# Patient Record
Sex: FEMALE | Race: WHITE | Employment: OTHER | URBAN - METROPOLITAN AREA
[De-identification: names, ages, dates, MRNs, and addresses within clinical notes are randomized per-mention and may not be internally consistent; named-entity substitution may affect disease eponyms.]

---

## 2018-01-03 ENCOUNTER — OP HISTORICAL/CONVERTED ENCOUNTER (OUTPATIENT)
Dept: OTHER | Age: 68
End: 2018-01-03

## 2018-09-10 ENCOUNTER — OP HISTORICAL/CONVERTED ENCOUNTER (OUTPATIENT)
Dept: OTHER | Age: 68
End: 2018-09-10

## 2018-09-12 ENCOUNTER — IP HISTORICAL/CONVERTED ENCOUNTER (OUTPATIENT)
Dept: OTHER | Age: 68
End: 2018-09-12

## 2021-04-07 DIAGNOSIS — Z12.31 SCREENING MAMMOGRAM, ENCOUNTER FOR: Primary | ICD-10-CM

## 2021-06-30 ENCOUNTER — HOSPITAL ENCOUNTER (OUTPATIENT)
Dept: PHYSICAL THERAPY | Age: 71
Discharge: HOME | End: 2021-06-30
Payer: MEDICARE

## 2021-06-30 PROCEDURE — 97140 MANUAL THERAPY 1/> REGIONS: CPT

## 2021-06-30 PROCEDURE — 97162 PT EVAL MOD COMPLEX 30 MIN: CPT

## 2021-06-30 NOTE — PROGRESS NOTES
[address]  Ph: [phone]    Fax: [phone]    Initial Evaluation/Plan of Care/Statement of Necessity for Physical Therapy Services     Patient name: Jovan Lara    Date/Start of Care:2021  : 1950  [x]  Patient  Verified Provider#: [id number]          Referral source: Bruce Childers MD Return visit to MD: Aug 1st      Medical/Treatment Diagnosis: Pain in right knee [M25.561]  Right hip pain [M25.551]  Arthrodesis status [Z98.1]  Iliotibial band syndrome, unspecified leg [M76.30]    Payor: [place] MEDICARE / Plan: [place] MEDICARE PART A & B / Product Type: Medicare /       Prior Hospitalization: see medical history     Comorbidities: see chart   Prior Level of Function: Independent no device. Medications: Verified on Patient Summary List          Patient / Family readiness to learn indicated by: asking questions, trying to perform skills and interest  Persons(s) to be included in education: patient (P)  Barriers to Learning/Limitations: None  Patient Self Reported Health Status:fair   Rehabilitation Potential: good  Previous Treatment/Compliance: none. PMHx/Surgical Hx: Patient has back surgery and achiles tendeon repair on R leg, DM, OA, high BP  Work Hx: retired   Living Situation: Patient lives with family, in a house, with steps to go in/out. Barriers to progress: chronicity of condition   Motivation: Good  Substance use: N/A  Cognition: A & O x 4  Onset Date: about 1 year 2020   In time:11:20   Out time:12:25 Total Treatment Time (min): 65   Total Timed Codes (min): 10  1:1 Treatment Time ( only): 10   Visit #:  1 Visit count could not be calculated. Make sure you are using a visit which is associated with an episode. SUBJECTIVE  Patient was referred to PT due to R hip/knee pain that started over a year ago on the lateral side of her thigh. She reports no specific injuries to the area, she did received a hip injection since MD throught it was bursitis but it did not helped. . She c/o constant sharp pain, her walking is limited and she has pain as soon as she stands up and as she continues to walk pain gets worse. No reports of numbness or tingling and stated her knee ccational maddy. MD also thinks it might be coming from the back and she is following up her back surgeon. PCP did a x-ray, and dx her with arthodossis and ITB syndrome. Patient wishes  she would be more active. She reports pain with standing, walking, laying on the R side at night and as a result difficulty sleeping at night. She stated sitting gets some relief or ice/heat but its short lived. She c/o 6/10 pain at rest and 9-10/10 with activity. She reports functional limitations with: sleeping at night, WB, stairs and ambulation. Mechanism of Injury: Unknown      PAIN:  Area of pain: R hip lateral joint line. Pain Level (0-10 scale)  At rest: 6/10 With activity: 9/10    Worst: 10/10 Least: 4/10   Pain Level (0-10 scale) pre treatment: 6/10  Pain Level (0-10 scale) post treatment: 7/10 post ice and STM tender to area  Goal: \" Relief some of the pain\"   OBJECTIVE  Physical Findings   Ortho:   Posture:  Decreased lordosis, R foot ER, leans towards L side with WB. Gross findings:  Antalgic gait does not wear orthotics but has them  Gait and Functional Mobility:  Antalgic /slow gait pattern  Palpation: TTP on R glut, ITB from hip to knee, TTP GT   Mild swlling at GT   Spine: *normal values in ()  Specific joints:   Hip      AROM       PROM             MMT   R L R L R L   Flexion (120) 92  110   4-p! 4-   Extension (15)     4- 3+   Abduction (40)     3-p!      Adduction (30)         IR (40)         ER (40)         Additional comments:Tightness noted with hip ER on L LE /Sharp pain reported on on R hip with PROM in all directions   SLR lag on R LE due pain   Difficulty keeping knee in extension with hip abd   Unable to lay on R side Knee          AROM          PROM                       MMT   R L R L R L   Extension (0)  0 0   4p! 4   Flexion (145) 130 132   4-p! 4   Able to lay prone     L quad flexibility 85deg  R quad 70 p! Lateral hip    ANKLE                               AROM                     PROM                     MMT:   R L R L R L   Dorsiflexion (15)      3+p! At hip  4   Plantarflexion (50)     4 4   Inversion (35)          Eversion (25)         Additional comments:       Trunk ROM:   Flexion:                         [] N/A  []WNL  []Minimal  [x]Moderate  [] Major 39cm from 3rd finger to floor repots hip/knee  p! Extension:                     [] N/A  []WNL  []Minimal  [x]Moderate  [] Major p! At hip    Gait and Functional Mobility:  Transfers:   Bed mobility: independent unable to lay on R side lying  Sit to/from Supine: independent  Sit to/from stands: independent   Gait: antalgic gait   Assistive device: none but recommended a  Cane- to ease pain  Gait speed: NT  TUG test: 14 sec without device  Stairs: negotiate one step at a time. SLS  R 2-3sec  L 3 sec     Lumbar  Special Tests:    Trendelenberg: (+) R    FABERS: (+) on R>L R leg had less ROM   Knee  Special Tests:  Knee Varus/Valgus Stress: (-)   Hip  Special Tests:          Oneda [place]: (-)    Александр: (+) R LE                  H.S. SLR: L HS 60 deg / R LE 60deg Piriformis Ext: (-)          Hip Scour: Limited range noted       Modality rationale: decrease edema, decrease inflammation, decrease pain, increase tissue extensibility and increase muscle contraction/control to improve the patients ability to ambulate with less pain.     Min Type Additional Details    [] Estim: []UnAtt   []Att       []TENS instruct                  []IFC  []Premod   []NMESS []w/US   []w/ice   []w/heat  Position:                                     Location:   10 [x]  Ice     []  Heat  []  Ice massage Position:L sidel lying Location:    []  Traction: [] Cervical       []Lumbar []Intermittent   []Continuous                     Lbs:  []W/heat               []W/heat and Estim    []  Ultrasound: []Continuous   [] Pulsed at:                           []1MHz   []3MHz Location:  W/cm2:    [] Skin assessment post-treatment:   []intact []redness- no adverse reaction   []redness - adverse reaction:   10 min Manual Therapy: R GT and ITB area from hip to knee   Rationale: decrease pain, increase ROM, increase tissue extensibility, decrease edema , correct positional vertigo, decrease trigger points and increase postural awareness to improve the patients ability to ambulate with less pain. With   [] TE   [] TA   [] Neuro   [] SC   [x] other: Patient Education: [] Review HEP    [] Progressed/Changed HEP based on:   [] positioning   [] body mechanics   [] transfers   [] heat/ice application    [] other: educated about using ice, sleeping with pillow between legs, using a cane and massaging area. ASSESSMENT/Changes in Function:   Patient is 68 y/o female was referred to skilled PT services with c/o chronic R hip/knee pain that started over a year ago. Patient presents with decreased R hip AROM and pain with each hip movement, decreased BLE hip/knee strength, decreased balance, decreased hamstring, ITB and quad flexibility and gait impairments. Imaging showed Patient will benefit from skilled PT services to address above.    Problem List/Impairments: pain affecting function, decrease ROM, decrease strength, edema affecting function, impaired gait/ balance, decrease ADL/ functional abilitiies, decrease activity tolerance, decrease flexibility/ joint mobility and decrease transfer abilities  Treatment Plan may include any combination of the following: Therapeutic exercise, Neuromuscular re-education, Physical agent/modality, Gait/balance training, Manual therapy, Patient education, Home safety training and Stair training  Patient/ Caregiver education and instruction: exercises  Frequency / Duration: Patient to be seen 2 times per week for 16  treatments. Certification Period: 6/30/21-9/30/21  Patient Goal (s):  Relief some of the pain        Short Term Goals: To be accomplished in 3-4 treatments. 1. Patient will be independent with her HEP to progress with POC. 2. Patient will increased strength in LEs by 1/2 to 1 grade to improve hip stability. 3. Patient TUG score will improve by 2-3 points to reduce fall risk. 4. Patient will be able to perform a SLS on B LEs > 5-10 sec     Long Term Goals: To be accomplished in 16 treatments. 1. Patient will be able to lay on the R side and sleep more comfortable at night to improve her sleeping quality. 2. Patient will be able to stand >15 min with less R hip discomfort and pain. 3. Patient will be able to do a sit<->stand transfers with less R hip pain and discomfort. 4. Patient will be able to ambulate >15 min with less R hip pain and discomfort. [x]  Plan of care has been reviewed with PTA. The Plan of Care is based on information from the initial evaluation. Michelle Llanos, PT, DPT 6/30/2021   ________________________________________________________________________    I certify that the above Therapy Services are being furnished while the patient is under my care. I agree with the treatment plan and certify that this therapy is necessary.     Physician's Signature:_________________________________________________  Date:____________Time: ____________     Kisha Zavala MD  `

## 2021-07-07 ENCOUNTER — HOSPITAL ENCOUNTER (OUTPATIENT)
Dept: PHYSICAL THERAPY | Age: 71
Discharge: HOME | End: 2021-07-07
Payer: MEDICARE

## 2021-07-07 PROCEDURE — 97140 MANUAL THERAPY 1/> REGIONS: CPT

## 2021-07-07 PROCEDURE — 97110 THERAPEUTIC EXERCISES: CPT

## 2021-07-07 NOTE — PROGRESS NOTES
PT DAILY TREATMENT NOTE - MCR     Patient Name: Talya Alexandre  Date:2021  : 1950  [x]  Patient  Verified  Payor: Jorge Ours / Plan: [place] MEDICARE PART A & B / Product Type: Medicare /    Treatment Area: Pain in right knee [M25.561]  Right hip pain [M25.551]  Arthrodesis status [Z98.1]  Iliotibial band syndrome, unspecified leg [M76.30]   Next MD APPT:   In time:11:32 Out time:12:32   Total Treatment Time (min): 60   Total Timed Codes (min):  50  1:1 Treatment Time ( [address] only): 50   Visit #:   Visit count could not be calculated. Make sure you are using a visit which is associated with an episode. SUBJECTIVE  Pain Level (0-10 scale) pre treatment: 6/10 B hips Pain Level (0-10 scale) post treatment:   Any medication changes, allergies to medications, adverse drug reactions, diagnosis change, or new procedure performed?:   [] No    [] Yes (see summary sheet for update)  Subjective functional status/changes:   [] No changes reported  Reports she hurt her L hip not sure how, went to [facility] urgent care and they gave her lidicaine patch and muscle relaxer they though it was muscular since pain is with movement note at rest. She arrived with a mild  antalgic gait pattern and stated she will try to do her exercises. OBJECTIVE    Modality rationale: decrease edema, decrease inflammation, decrease pain, increase tissue extensibility and increase muscle contraction/control to improve the patients ability to reduce pain with mobility.     Min Type Additional Details    [] Estim: []UnAtt   []Att       []TENS instruct                  []IFC  []Premod   []NMES                     []Other:  []w/US   []w/ice   []w/heat  Position:  Location:   10 min x 2   [x]  Ice     [x]  Heat  []  Ice massage Position:heat ( supine with wedge B hips)  Ice on R hip in side lying       []  Traction: [] Cervical       []Lumbar                       [] Prone          []Supine                       []Intermittent   []Continuous Lbs:  []w/heat  []W/heat and Estim    []  Ultrasound: []Continuous   [] Pulsed at:                           []1MHz   []3MHz Location:  W/cm2:      [x] Skin assessment post-treatment:   [x]intact  []redness- no adverse reaction   []redness - adverse reaction:   30 min Therapeutic Exercise:  [x] See flow sheet :   Rationale: increase ROM, increase strength, improve coordination, improve balance and increase proprioception to improve the patients ability to reduce pain with ambulation    10 min Manual Therapy: L hip   Rationale: decrease pain, increase ROM, increase tissue extensibility, decrease edema , decrease trigger points and increase postural awareness to improve the patients ability to ambulate with less antalgic gait pattern. With   [x] TE   [] TA   [] Neuro   [] SC   [] other: Patient Education: [x] Review HEP    [x] Progressed/Changed HEP based on:   [] positioning   [] body mechanics   [] transfers   [] Use of heat/ice    [] other:          Other Objective/Functional Measures:   POC was initiated     ASSESSMENT/Changes in Function:   Patient tolerated session well, was able to do all exercises and stretches, has some discomfort with B hip ER stretch and required assist with standing hip ITB stretch. Patient presents with tightness ITB in side lying and TTP at grater trochanter down towards R knee but not as tender as on evaluation. Patient was encouraged to continue to ice R hip and alternate heat/ice on L hip. Patient reports slight relief post session. Patient will continue to benefit from skilled PT services to modify and progress therapeutic interventions, address functional mobility deficits, address ROM deficits, address strength deficits, analyze and address soft tissue restrictions, analyze and cue movement patterns, analyze and modify body mechanics/ergonomics and assess and modify postural abnormalities to attain remaining goals. GOALS/Progress towards goals:  Short Term Goals:  To be accomplished in 3-4 treatments. 1. Patient will be independent with her HEP to progress with POC. [] Met [] Not met [] Partially met     2. Patient will increased strength in LEs by 1/2 to 1 grade to improve hip stability. [] Met [] Not met [] Partially met     3. Patient TUG score will improve by 2-3 points to reduce fall risk. [] Met [] Not met [] Partially met     4. Patient will be able to perform a SLS on B LEs > 5-10 sec [] Met [] Not met [] Partially met        Long Term Goals: To be accomplished in 16 treatments. 1. Patient will be able to lay on the R side and sleep more comfortable at night to improve her sleeping quality. [] Met [] Not met [] Partially met     2. Patient will be able to stand >15 min with less R hip discomfort and pain. [] Met [] Not met [] Partially met     3. Patient will be able to do a sit<->stand transfers with less R hip pain and discomfort. [] Met [] Not met [] Partially met     4. Patient will be able to ambulate >15 min with less R hip pain and discomfort.  [] Met [] Not met [] Partially met        PLAN  [x]  Upgrade activities as tolerated     [x]  Continue plan of care  []  Update interventions per flow sheet       []  Discharge due to:_  []  Other:_      Michelle Llanos, PT, DPT 7/7/2021

## 2021-07-12 ENCOUNTER — HOSPITAL ENCOUNTER (OUTPATIENT)
Dept: PHYSICAL THERAPY | Age: 71
Discharge: HOME | End: 2021-07-12
Payer: MEDICARE

## 2021-07-12 PROCEDURE — 97140 MANUAL THERAPY 1/> REGIONS: CPT

## 2021-07-12 PROCEDURE — 97110 THERAPEUTIC EXERCISES: CPT

## 2021-07-12 NOTE — PROGRESS NOTES
PT DAILY TREATMENT NOTE - MCR     Patient Name: Cheryl Manning  Date:2021  : 1950  [x]  Patient  Verified  Payor: Adis Adkins / Plan: [place] MEDICARE PART A & B / Product Type: Medicare /    Treatment Area: Pain in right knee [M25.561]  Right hip pain [M25.551]  Arthrodesis status [Z98.1]  Iliotibial band syndrome, unspecified leg [M76.30]   Next MD APPT:   In time:11:18 am Out time:12:25  Total Treatment Time (min): 67   Total Timed Codes (min):  57  1:1 Treatment Time ( [address] only): 57  Visit #: 3/16  Visit count could not be calculated. Make sure you are using a visit which is associated with an episode. SUBJECTIVE  Pain Level (0-10 scale) pre treatment: 5/10 B hips Pain Level (0-10 scale) post treatment: /10  Any medication changes, allergies to medications, adverse drug reactions, diagnosis change, or new procedure performed?:   [] No    [] Yes (see summary sheet for update)  Subjective functional status/changes:   [] No changes reported  Stated her right feels better, oc  Reports she hurt her L hip not sure how, went to [facility] urgent care and they gave her lidicaine patch and muscle relaxer they though it was muscular since pain is with movement note at rest. She arrived with a mild  antalgic gait pattern and stated she will try to do her exercises. OBJECTIVE    Modality rationale: decrease edema, decrease inflammation, decrease pain, increase tissue extensibility and increase muscle contraction/control to improve the patients ability to reduce pain with mobility.     Min Type Additional Details    [] Estim: []UnAtt   []Att       []TENS instruct                  []IFC  []Premod   []NMES                     []Other:  []w/US   []w/ice   []w/heat  Position:  Location:   10 min  [x]  Ice     []  Heat  []  Ice massage Position: side lying  Ice on R hip       []  Traction: [] Cervical       []Lumbar                       [] Prone          []Supine                       []Intermittent   []Continuous Lbs:  []w/heat  []W/heat and Estim    []  Ultrasound: []Continuous   [] Pulsed at:                           []1MHz   []3MHz Location:  W/cm2:      [x] Skin assessment post-treatment:   [x]intact  []redness- no adverse reaction   []redness - adverse reaction:   47 min Therapeutic Exercise:  [x] See flow sheet :   Rationale: increase ROM, increase strength, improve coordination, improve balance and increase proprioception to improve the patients ability to reduce pain with ambulation    15 min Manual Therapy: L hip, ITB area   Rationale: decrease pain, increase ROM, increase tissue extensibility, decrease edema , decrease trigger points and increase postural awareness to improve the patients ability to ambulate with less antalgic gait pattern. With   [x] TE   [] TA   [] Neuro   [] SC   [] other: Patient Education: [x] Review HEP    [x] Progressed/Changed HEP based on:   [] positioning   [] body mechanics   [] transfers   [] Use of heat/ice    [] other:          Other Objective/Functional Measures:   POC was continued      ASSESSMENT/Changes in Function:   Patient tolerated session well, we continue to progress with exercises. Modified ITB stretch to supine and added more hip strengthening. Patient is TTP at greater trochanter and long ITB  With STM. She reports pain with hip ITB stretch in side lying and supine but tolerated stretch. . Patient was encouraged to continue to ice R hip and alternate heat/ice on L hip. Patient reports slight relief post session. Patient will continue to benefit from skilled PT services to modify and progress therapeutic interventions, address functional mobility deficits, address ROM deficits, address strength deficits, analyze and address soft tissue restrictions, analyze and cue movement patterns, analyze and modify body mechanics/ergonomics and assess and modify postural abnormalities to attain remaining goals. GOALS/Progress towards goals:  Short Term Goals:  To be accomplished in 3-4 treatments. 1. Patient will be independent with her HEP to progress with POC. [] Met [] Not met [] Partially met     2. Patient will increased strength in LEs by 1/2 to 1 grade to improve hip stability. [] Met [] Not met [] Partially met     3. Patient TUG score will improve by 2-3 points to reduce fall risk. [] Met [] Not met [] Partially met     4. Patient will be able to perform a SLS on B LEs > 5-10 sec [] Met [] Not met [] Partially met        Long Term Goals: To be accomplished in 16 treatments. 1. Patient will be able to lay on the R side and sleep more comfortable at night to improve her sleeping quality. [] Met [] Not met [] Partially met     2. Patient will be able to stand >15 min with less R hip discomfort and pain. [] Met [] Not met [] Partially met     3. Patient will be able to do a sit<->stand transfers with less R hip pain and discomfort. [] Met [] Not met [] Partially met     4. Patient will be able to ambulate >15 min with less R hip pain and discomfort.  [] Met [] Not met [] Partially met        PLAN  [x]  Upgrade activities as tolerated     [x]  Continue plan of care  []  Update interventions per flow sheet       []  Discharge due to:_  []  Other:_      Michelle Llanos, PT, DPT 7/12/2021

## 2021-07-14 ENCOUNTER — HOSPITAL ENCOUNTER (OUTPATIENT)
Dept: PHYSICAL THERAPY | Age: 71
Discharge: HOME | End: 2021-07-14
Payer: MEDICARE

## 2021-07-14 PROCEDURE — 97110 THERAPEUTIC EXERCISES: CPT

## 2021-07-14 PROCEDURE — 97035 APP MDLTY 1+ULTRASOUND EA 15: CPT

## 2021-07-14 PROCEDURE — 97140 MANUAL THERAPY 1/> REGIONS: CPT

## 2021-07-14 NOTE — PROGRESS NOTES
PT DAILY TREATMENT NOTE - [facility]     Patient Name: Rafael Groves  Date:2021  : 1950  [x]  Patient  Verified  Payor: Benedict Locker / Plan: [place] / Product Type: Medicare /    Treatment Area: Pain in right knee [M25.561]  Right hip pain [M25.551]  Arthrodesis status [Z98.1]  Iliotibial band syndrome, unspecified leg [M76.30]   Next MD APPT:   In time:11:23 am Out time:12:32  Total Treatment Time (min): 68  Total Timed Codes (min):  53  1:1 Treatment Time ([facility] only): 53  Visit #:   Visit count could not be calculated. Make sure you are using a visit which is associated with an episode. SUBJECTIVE  Pain Level (0-10 scale) pre treatment: 7/10 B hips Pain Level (0-10 scale) post treatment: 4/10 more loose but has some pain. Any medication changes, allergies to medications, adverse drug reactions, diagnosis change, or new procedure performed?:   [] No    [] Yes (see summary sheet for update)  Subjective functional status/changes:   [] No changes reported  Reports more R ITB pain today that shots down to the knee, she did not do any thing difference but been having pain that comes and goes since this morning. . She arrived with a mild  antalgic gait pattern and reports L hip still hurts and she has a patch on. OBJECTIVE    Modality rationale: decrease edema, decrease inflammation, decrease pain, increase tissue extensibility and increase muscle contraction/control to improve the patients ability to reduce pain with mobility.     Min Type Additional Details    [] Estim: []UnAtt   []Att       []TENS instruct                  []IFC  []Premod   []NMES                     []Other:  []w/US   []w/ice   []w/heat  Position:  Location:   15 min  [x]  Ice     [x]  Heat  []  Ice massage Position: supine    Ice on R hip / heat on R ITB and L hip      []  Traction: [] Cervical       []Lumbar                       [] Prone          []Supine                       []Intermittent   []Continuous Lbs:  []w/heat  []W/heat and Estim   8 [x]  Ultrasound: [x]Continuous   [] Pulsed at:                           []1MHz   [x]3MHz Location:R ITB from GT to knee joint   W/cm2: 1.5      [x] Skin assessment post-treatment:   [x]intact  []redness- no adverse reaction   []redness - adverse reaction:   30 min Therapeutic Exercise:  [x] See flow sheet :   Rationale: increase ROM, increase strength, improve coordination, improve balance and increase proprioception to improve the patients ability to reduce pain with ambulation    15 min Manual Therapy: L hip, ITB area   Rationale: decrease pain, increase ROM, increase tissue extensibility, decrease edema , decrease trigger points and increase postural awareness to improve the patients ability to ambulate with less antalgic gait pattern. With   [x] TE   [] TA   [] Neuro   [] SC   [] other: Patient Education: [x] Review HEP    [x] Progressed/Changed HEP based on:   [] positioning   [] body mechanics   [] transfers   [] Use of heat/ice    [] other:          Other Objective/Functional Measures:   POC was continued - initiated US and performed more stretches and exercises as tolerated. ASSESSMENT/Changes in Function:   Patient was more TTP along R ITB today and reports sharp pain towards R knee, we initiated US and continue with STM to ITB area which was very tender and we noted increased trigger points. We also continue to note increased swelling on R hip area. Patient reports some discomfort with exercises thus we focused on stretches. Patient encouraged to continue to stretch area as tolerated an used heat/ice modalities. We can try foam roller next visit.     Patient will continue to benefit from skilled PT services to modify and progress therapeutic interventions, address functional mobility deficits, address ROM deficits, address strength deficits, analyze and address soft tissue restrictions, analyze and cue movement patterns, analyze and modify body mechanics/ergonomics and assess and modify postural abnormalities to attain remaining goals. GOALS/Progress towards goals:  Short Term Goals: To be accomplished in 3-4 treatments. 1. Patient will be independent with her HEP to progress with POC. [] Met [] Not met [] Partially met     2. Patient will increased strength in LEs by 1/2 to 1 grade to improve hip stability. [] Met [] Not met [] Partially met     3. Patient TUG score will improve by 2-3 points to reduce fall risk. [] Met [] Not met [] Partially met     4. Patient will be able to perform a SLS on B LEs > 5-10 sec [] Met [] Not met [] Partially met        Long Term Goals: To be accomplished in 16 treatments. 1. Patient will be able to lay on the R side and sleep more comfortable at night to improve her sleeping quality. [] Met [] Not met [] Partially met     2. Patient will be able to stand >15 min with less R hip discomfort and pain. [] Met [] Not met [x] Partially met     3. Patient will be able to do a sit<->stand transfers with less R hip pain and discomfort. [] Met [] Not met [x] Partially met     4. Patient will be able to ambulate >15 min with less R hip pain and discomfort.  [] Met [] Not met [] Partially met        PLAN  [x]  Upgrade activities as tolerated     [x]  Continue plan of care  []  Update interventions per flow sheet       []  Discharge due to:_  []  Other:_      Michelle Llanos, PT, DPT 7/14/2021

## 2021-07-26 ENCOUNTER — HOSPITAL ENCOUNTER (OUTPATIENT)
Dept: PHYSICAL THERAPY | Age: 71
Discharge: HOME | End: 2021-07-26
Payer: MEDICARE

## 2021-07-26 PROCEDURE — 97110 THERAPEUTIC EXERCISES: CPT

## 2021-07-26 PROCEDURE — 97140 MANUAL THERAPY 1/> REGIONS: CPT

## 2021-07-26 PROCEDURE — 97035 APP MDLTY 1+ULTRASOUND EA 15: CPT

## 2021-07-26 NOTE — PROGRESS NOTES
PT DAILY TREATMENT NOTE - [facility]     Patient Name: Srinivas Armstrong  Date:2021  : 1950  [x]  Patient  Verified  Payor: Nela Hunter / Plan: [place] MEDICARE PART A & B / Product Type: Medicare /    Treatment Area: Pain in right knee [M25.561]  Right hip pain [M25.551]  Arthrodesis status [Z98.1]  Iliotibial band syndrome, unspecified leg [M76.30]   Next MD APPT:   In time:11:23 am Out time:12:20  Total Treatment Time (min): 57  Total Timed Codes (min):  57  1:1 Treatment Time ([facility] only): 57  Visit #:   Visit count could not be calculated. Make sure you are using a visit which is associated with an episode. SUBJECTIVE  Pain Level (0-10 scale) pre treatment: 6-7/10 B hips Pain Level (0-10 scale) post treatment: 5/10 more loose but has some pain. Any medication changes, allergies to medications, adverse drug reactions, diagnosis change, or new procedure performed?:   [] No    [] Yes (see summary sheet for update)  Subjective functional status/changes:   [] No changes reported  Patient continues to report intermittent R ITB hip/knee pain that shoots down the knee. She continues to presents with an antalgic gait pattern. Stated her L hip feels good, no more pain. She is gong to see MD this Wednesday. OBJECTIVE    Modality rationale: decrease edema, decrease inflammation, decrease pain, increase tissue extensibility and increase muscle contraction/control to improve the patients ability to reduce pain with mobility.     Min Type Additional Details    [] Estim: []UnAtt   []Att       []TENS instruct                  []IFC  []Premod   []NMES                     []Other:  []w/US   []w/ice   []w/heat  Position:  Location:   5  []  Ice     []  Heat  [x]  Ice massage Position: L side ly ing and prone   Location: R ITB/GT      []  Traction: [] Cervical       []Lumbar                       [] Prone          []Supine                       []Intermittent   []Continuous Lbs:  []w/heat  []W/heat and Estim   8 [x] Ultrasound: [x]Continuous   [] Pulsed at:                           []1MHz   [x]3MHz Location:R ITB from GT to knee joint   W/cm2: 1.5      [x] Skin assessment post-treatment:   [x]intact  []redness- no adverse reaction   []redness - adverse reaction:   30 min Therapeutic Exercise:  [x] See flow sheet :   Rationale: increase ROM, increase strength, improve coordination, improve balance and increase proprioception to improve the patients ability to reduce pain with ambulation    14 min Manual Therapy: L hip, ITB area   Rationale: decrease pain, increase ROM, increase tissue extensibility, decrease edema , decrease trigger points and increase postural awareness to improve the patients ability to ambulate with less antalgic gait pattern. With   [x] TE   [] TA   [] Neuro   [] SC   [] other: Patient Education: [x] Review HEP    [x] Progressed/Changed HEP based on:   [] positioning   [] body mechanics   [] transfers   [] Use of heat/ice    [] other:          Other Objective/Functional Measures:   POC was continued noted more swelling on R hip and antalgic gait  We continue with US and added ice massage.     - initiated US and performed more stretches and exercises as tolerated. ASSESSMENT/Changes in Function:   Patient continues to progress very slowly with exercises she reports shooting pain on R ITB and stated therapy has not help much. Patient still presents with an antalgic gait pattern and she is TTP on R GT and ITB muscles, we also continue to note general B hip weakness and tightness in hamstrings, piriformis and hip adductors. Patient also reports cramping in L>R hamstring. Patient encouraged to continue to stretch area as tolerated an use ice for modalities. Patient was not able to tolerate the foam roller.  Patient will continue to benefit from skilled PT services to modify and progress therapeutic interventions, address functional mobility deficits, address ROM deficits, address strength deficits, analyze and address soft tissue restrictions, analyze and cue movement patterns, analyze and modify body mechanics/ergonomics and assess and modify postural abnormalities to attain remaining goals. GOALS/Progress towards goals:  Short Term Goals: To be accomplished in 3-4 treatments. 1. Patient will be independent with her HEP to progress with POC. [x] Met [] Not met [] Partially met     2. Patient will increased strength in LEs by 1/2 to 1 grade to improve hip stability. [] Met [] Not met [] Partially met     3. Patient TUG score will improve by 2-3 points to reduce fall risk. [] Met [] Not met [] Partially met     4. Patient will be able to perform a SLS on B LEs > 5-10 sec [] Met [] Not met [] Partially met        Long Term Goals: To be accomplished in 16 treatments. 1. Patient will be able to lay on the R side and sleep more comfortable at night to improve her sleeping quality. [] Met [] Not met [x] Partially met     2. Patient will be able to stand >15 min with less R hip discomfort and pain. [] Met [] Not met [x] Partially met     3. Patient will be able to do a sit<->stand transfers with less R hip pain and discomfort. [] Met [] Not met [x] Partially met     4. Patient will be able to ambulate >15 min with less R hip pain and discomfort.  [] Met [] Not met [x] Partially met        PLAN  [x]  Upgrade activities as tolerated     [x]  Continue plan of care  []  Update interventions per flow sheet       []  Discharge due to:_  []  Other:_      Michelle Llanos, PT, DPT 7/26/2021

## 2021-07-28 ENCOUNTER — APPOINTMENT (OUTPATIENT)
Dept: PHYSICAL THERAPY | Age: 71
End: 2021-07-28
Payer: MEDICARE

## 2021-08-04 ENCOUNTER — APPOINTMENT (OUTPATIENT)
Dept: PHYSICAL THERAPY | Age: 71
End: 2021-08-04

## 2021-08-06 NOTE — PROGRESS NOTES
[address] [address] [facility] [address] [facility], [address] [place] [address]  Ph: [phone]  Fax: [phone]    Discharge Summary 2-15    Patient name: Evelyn Jones  : 1950  Provider#: [id number]  Referral source: Isak Michelle MD      Medical/Treatment Diagnosis: Pain in right knee [M25.561]  Right hip pain [M25.551]  Arthrodesis status [Z98.1]  Iliotibial band syndrome, unspecified leg [M76.30]     Prior Hospitalization: see medical history     Comorbidities: See Plan of Care  Prior Level of Function: See Plan of Care  Medications: Verified on Patient Summary List  Start of Care: 21  Onset Date:   Visits from Start of Care: 5  Missed Visits: 0  Certification Period : 21 to 21    Assessment/Summary of care/GOALS:  Patient call back stated she wants to be D/C since she is not making any  improvement in therapy. GOALS/Progress towards goals:  Short Term Goals: To be accomplished in 3-4 treatments. 1. Patient will be independent with her HEP to progress with POC. [x]? Met []? Not met []? Partially met      2. Patient will increased strength in LEs by 1/2 to 1 grade to improve hip stability. []? Met []? Not met []? Partially met      3. Patient TUG score will improve by 2-3 points to reduce fall risk. []? Met []? Not met []? Partially met      4. Patient will be able to perform a SLS on B LEs > 5-10 sec []? Met []? Not met []? Partially met         Long Term Goals: To be accomplished in 16 treatments. 1. Patient will be able to lay on the R side and sleep more comfortable at night to improve her sleeping quality. []? Met []? Not met [x]? Partially met      2. Patient will be able to stand >15 min with less R hip discomfort and pain. []? Met []? Not met [x]? Partially met      3. Patient will be able to do a sit<->stand transfers with less R hip pain and discomfort. []? Met []? Not met [x]? Partially met      4.  Patient will be able to ambulate >15 min with less R hip pain and discomfort. []? Met []? Not met [x]?  Partially met         [facility] Score:  56.67%  RECOMMENDATIONS:  [x]Discontinue therapy: []Patient has reached or is progressing toward set goals and is independent with HEP     []Patient is non-compliant or has abdicated     [x]Due to lack of appreciable progress towards set goals     []Other  Barronett Foot, PT, DPT 8/6/2021

## 2021-11-11 ENCOUNTER — OFFICE VISIT (OUTPATIENT)
Dept: OBGYN CLINIC | Age: 71
End: 2021-11-11
Payer: MEDICARE

## 2021-11-11 VITALS
DIASTOLIC BLOOD PRESSURE: 75 MMHG | BODY MASS INDEX: 30.48 KG/M2 | OXYGEN SATURATION: 98 % | WEIGHT: 172 LBS | HEIGHT: 63 IN | RESPIRATION RATE: 16 BRPM | SYSTOLIC BLOOD PRESSURE: 140 MMHG | HEART RATE: 100 BPM

## 2021-11-11 DIAGNOSIS — N95.1 MENOPAUSAL SYNDROME: ICD-10-CM

## 2021-11-11 DIAGNOSIS — Z12.72 SCREENING FOR MALIGNANT NEOPLASM OF VAGINA AFTER TOTAL HYSTERECTOMY: ICD-10-CM

## 2021-11-11 DIAGNOSIS — Z90.710 SCREENING FOR MALIGNANT NEOPLASM OF VAGINA AFTER TOTAL HYSTERECTOMY: ICD-10-CM

## 2021-11-11 DIAGNOSIS — R21 RASH: ICD-10-CM

## 2021-11-11 DIAGNOSIS — Z01.419 GYNECOLOGIC EXAM NORMAL: Primary | ICD-10-CM

## 2021-11-11 PROCEDURE — G0101 CA SCREEN;PELVIC/BREAST EXAM: HCPCS | Performed by: OBSTETRICS & GYNECOLOGY

## 2021-11-11 PROCEDURE — G8536 NO DOC ELDER MAL SCRN: HCPCS | Performed by: OBSTETRICS & GYNECOLOGY

## 2021-11-11 PROCEDURE — G8510 SCR DEP NEG, NO PLAN REQD: HCPCS | Performed by: OBSTETRICS & GYNECOLOGY

## 2021-11-11 PROCEDURE — G8417 CALC BMI ABV UP PARAM F/U: HCPCS | Performed by: OBSTETRICS & GYNECOLOGY

## 2021-11-11 PROCEDURE — G8427 DOCREV CUR MEDS BY ELIG CLIN: HCPCS | Performed by: OBSTETRICS & GYNECOLOGY

## 2021-11-11 RX ORDER — TELMISARTAN 20 MG/1
TABLET ORAL DAILY
COMMUNITY

## 2021-11-11 RX ORDER — ESTRADIOL 0.1 MG/G
CREAM VAGINAL
Qty: 42.5 G | Refills: 1 | Status: SHIPPED | OUTPATIENT
Start: 2021-11-11

## 2021-11-11 RX ORDER — CLOTRIMAZOLE AND BETAMETHASONE DIPROPIONATE 10; .64 MG/G; MG/G
CREAM TOPICAL 2 TIMES DAILY
Qty: 15 G | Refills: 0 | Status: SHIPPED | OUTPATIENT
Start: 2021-11-11

## 2021-11-11 NOTE — PROGRESS NOTES
Doc Pop is a 66 y.o. female, , No LMP recorded. Patient has had a hysterectomy. , who presents today for the following:  Chief Complaint   Patient presents with    Annual Exam        Allergies   Allergen Reactions    Bactrim [Sulfamethoprim Ds] Anxiety       Current Outpatient Medications   Medication Sig    telmisartan (MICARDIS) 20 mg tablet Take  by mouth daily.  SITagliptin (Januvia) 50 mg tablet Take 50 mg by mouth daily.  estradioL (ESTRACE) 0.01 % (0.1 mg/gram) vaginal cream 1 gram in vagina 3x a week    clotrimazole-betamethasone (LOTRISONE) topical cream Apply  to affected area two (2) times a day.  benazepril-hydrochlorthiazide (LOTENSIN HCT) 10-12.5 mg per tablet Take 1 Tab by mouth daily.  atorvastatin (LIPITOR) 20 mg tablet Take 20 mg by mouth daily.  sertraline (ZOLOFT) 25 mg tablet Take 20 mg by mouth daily. No current facility-administered medications for this visit.        Past Medical History:   Diagnosis Date    CAD (coronary artery disease)     hyperlipidemia    Diabetes (HCC)     diet controlled    GERD (gastroesophageal reflux disease)     Hypercholesterolemia     Hypertension        Past Surgical History:   Procedure Laterality Date    HX  SECTION      HX CHOLECYSTECTOMY      HX GYN      3 , hysterectomy    MT ABDOMEN SURGERY PROC UNLISTED      cholecystectomy       Family History   Problem Relation Age of Onset    Cancer Mother     Heart Disease Father     Diabetes Father        Social History     Socioeconomic History    Marital status:      Spouse name: Not on file    Number of children: Not on file    Years of education: Not on file    Highest education level: Not on file   Occupational History    Not on file   Tobacco Use    Smoking status: Never Smoker    Smokeless tobacco: Never Used   Substance and Sexual Activity    Alcohol use: Not Currently    Drug use: Never    Sexual activity: Yes     Partners: Male   Other Topics Concern    Not on file   Social History Narrative    Not on file     Social Determinants of Health     Financial Resource Strain:     Difficulty of Paying Living Expenses: Not on file   Food Insecurity:     Worried About [address] in the Last Year: Not on file    Alvin of Food in the Last Year: Not on file   Transportation Needs:     Lack of Transportation (Medical): Not on file    Lack of Transportation (Non-Medical): Not on file   Physical Activity:     Days of Exercise per Week: Not on file    Minutes of Exercise per Session: Not on file   Stress:     Feeling of Stress : Not on file   Social Connections:     Frequency of Communication with Friends and Family: Not on file    Frequency of Social Gatherings with Friends and Family: Not on file    Attends [employer] Services: Not on file    Active Member of [address] [place] [address] or Organizations: Not on file    Attends Club or Organization Meetings: Not on file    Marital Status: Not on file   Intimate Partner Violence:     Fear of Current or Ex-Partner: Not on file    Emotionally Abused: Not on file    Physically Abused: Not on file    Sexually Abused: Not on file   Housing Stability:     Unable to Pay for Housing in the Last Year: Not on file    Number of [place] in the Last Year: Not on file    Unstable Housing in the Last Year: Not on file         HPI  Annual    Review of Systems   Constitutional: Negative. Respiratory: Negative. Cardiovascular: Negative. Gastrointestinal: Negative. Genitourinary: Negative. Musculoskeletal: Negative. Skin: Negative. Neurological: Negative. Endo/Heme/Allergies: Negative. Psychiatric/Behavioral: Negative. All other systems reviewed and are negative.          BP (!) 140/75 (BP 1 Location: Right arm, BP Patient Position: Sitting)   Pulse 100   Resp 16   Ht 5' 3\" (1.6 m)   Wt 172 lb (78 kg)   SpO2 98%   BMI 30.47 kg/m²    OBGyn Exam   Constitutional: General Appearance: healthy-appearing, well-nourished, and well-developed; Level of Distress: NAD. Ambulation: ambulating normally. Psychiatric:   Insight: good judgement. Mental Status: normal mood and affect and active and alert. Orientation: to time, place, and person. Memory: recent memory normal and remote memory normal.     Head: Head: normocephalic and atraumatic. Neck:   Neck: supple, FROM, trachea midline, and no masses. Lymph Nodes: no cervical LAD, supraclavicular LAD, axillary LAD, or inguinal LAD. Thyroid: no enlargement or nodules and non-tender. Lungs:   Respiratory effort: no dyspnea. Cardiovascular:      Pulses including femoral / pedal: normal throughout. Breast: Breast: no masses or abnormal secretions and normal appearance. rash    Abdomen:   : no tenderness, guarding, masses, rebound tenderness, or CVA tenderness and non-distended. Female :   External genitalia: no lesions or rash and normal.   Vagina thin mucosa  Cervix: absent  Uterus: absent  Adnexae: no adnexal mass or tenderness . Bladder and Urethra: normal bladder and urethra (except where noted). .     Skin:   Inspection and palpation: no rash, lesions, ulcer, induration, nodules, jaundice, or abnormal nevi and good turgor. Nails: normal.     Back: Thoracolumbar Appearance: normal curvature. 1. Gynecologic exam normal    - PAP IG, RFX APTIMA HPV ASCUS ([id number])    2. Screening for malignant neoplasm of vagina after total hysterectomy      3. Menopausal syndrome    - estradioL (ESTRACE) 0.01 % (0.1 mg/gram) vaginal cream; 1 gram in vagina 3x a week  Dispense: 42.5 g; Refill: 1    4. Rash    - clotrimazole-betamethasone (LOTRISONE) topical cream; Apply  to affected area two (2) times a day.   Dispense: 15 g; Refill: 0

## 2021-11-15 LAB
CYTOLOGIST CVX/VAG CYTO: NORMAL
CYTOLOGY CVX/VAG DOC CYTO: NORMAL
CYTOLOGY CVX/VAG DOC THIN PREP: NORMAL
DX ICD CODE: NORMAL
LABCORP, 190119: NORMAL
Lab: NORMAL
OTHER STN SPEC: NORMAL
STAT OF ADQ CVX/VAG CYTO-IMP: NORMAL

## 2022-05-31 DIAGNOSIS — Z12.31 SCREENING MAMMOGRAM, ENCOUNTER FOR: Primary | ICD-10-CM

## 2022-06-23 DIAGNOSIS — Z12.31 SCREENING MAMMOGRAM, ENCOUNTER FOR: ICD-10-CM

## 2023-03-10 ENCOUNTER — HOSPITAL ENCOUNTER (OUTPATIENT)
Dept: PREADMISSION TESTING | Age: 73
Discharge: HOME | End: 2023-03-10
Payer: MEDICARE

## 2023-03-10 VITALS
HEART RATE: 73 BPM | BODY MASS INDEX: 29.21 KG/M2 | HEIGHT: 64 IN | WEIGHT: 171.08 LBS | TEMPERATURE: 98 F | DIASTOLIC BLOOD PRESSURE: 78 MMHG | SYSTOLIC BLOOD PRESSURE: 126 MMHG

## 2023-03-10 LAB
ANION GAP SERPL CALC-SCNC: 7 MMOL/L (ref 5–15)
ATRIAL RATE: 67 BPM
BASOPHILS # BLD: 0 K/UL (ref 0–0.1)
BASOPHILS NFR BLD: 1 % (ref 0–1)
BUN SERPL-MCNC: 14 MG/DL (ref 6–20)
BUN/CREAT SERPL: 15 (ref 12–20)
CALCIUM SERPL-MCNC: 9.6 MG/DL (ref 8.5–10.1)
CALCULATED P AXIS, ECG09: 5 DEGREES
CALCULATED R AXIS, ECG10: 15 DEGREES
CALCULATED T AXIS, ECG11: 24 DEGREES
CHLORIDE SERPL-SCNC: 99 MMOL/L (ref 97–108)
CO2 SERPL-SCNC: 31 MMOL/L (ref 21–32)
CREAT SERPL-MCNC: 0.93 MG/DL (ref 0.55–1.02)
DIAGNOSIS, 93000: NORMAL
DIFFERENTIAL METHOD BLD: ABNORMAL
EOSINOPHIL # BLD: 0.1 K/UL (ref 0–0.4)
EOSINOPHIL NFR BLD: 1 % (ref 0–7)
ERYTHROCYTE [DISTWIDTH] IN BLOOD BY AUTOMATED COUNT: 12.1 % (ref 11.5–14.5)
EST. AVERAGE GLUCOSE BLD GHB EST-MCNC: 154 MG/DL
GLUCOSE SERPL-MCNC: 176 MG/DL (ref 65–100)
HBA1C MFR BLD: 7 % (ref 4–5.6)
HCT VFR BLD AUTO: 36.5 % (ref 35–47)
HGB BLD-MCNC: 11.9 G/DL (ref 11.5–16)
IMM GRANULOCYTES # BLD AUTO: 0.1 K/UL (ref 0–0.04)
IMM GRANULOCYTES NFR BLD AUTO: 1 % (ref 0–0.5)
LYMPHOCYTES # BLD: 2.8 K/UL (ref 0.8–3.5)
LYMPHOCYTES NFR BLD: 36 % (ref 12–49)
MCH RBC QN AUTO: 33.1 PG (ref 26–34)
MCHC RBC AUTO-ENTMCNC: 32.6 G/DL (ref 30–36.5)
MCV RBC AUTO: 101.4 FL (ref 80–99)
MONOCYTES # BLD: 0.7 K/UL (ref 0–1)
MONOCYTES NFR BLD: 9 % (ref 5–13)
NEUTS SEG # BLD: 4.1 K/UL (ref 1.8–8)
NEUTS SEG NFR BLD: 52 % (ref 32–75)
NRBC # BLD: 0 K/UL (ref 0–0.01)
NRBC BLD-RTO: 0 PER 100 WBC
P-R INTERVAL, ECG05: 120 MS
PLATELET # BLD AUTO: 194 K/UL (ref 150–400)
PMV BLD AUTO: 10.7 FL (ref 8.9–12.9)
POTASSIUM SERPL-SCNC: 4 MMOL/L (ref 3.5–5.1)
Q-T INTERVAL, ECG07: 446 MS
QRS DURATION, ECG06: 82 MS
QTC CALCULATION (BEZET), ECG08: 471 MS
RBC # BLD AUTO: 3.6 M/UL (ref 3.8–5.2)
SODIUM SERPL-SCNC: 137 MMOL/L (ref 136–145)
VENTRICULAR RATE, ECG03: 67 BPM
WBC # BLD AUTO: 7.8 K/UL (ref 3.6–11)

## 2023-03-10 PROCEDURE — 83036 HEMOGLOBIN GLYCOSYLATED A1C: CPT

## 2023-03-10 PROCEDURE — 85025 COMPLETE CBC W/AUTO DIFF WBC: CPT

## 2023-03-10 PROCEDURE — 36415 COLL VENOUS BLD VENIPUNCTURE: CPT

## 2023-03-10 PROCEDURE — 80048 BASIC METABOLIC PNL TOTAL CA: CPT

## 2023-03-10 PROCEDURE — 93005 ELECTROCARDIOGRAM TRACING: CPT

## 2023-03-10 RX ORDER — CHLORTHALIDONE 25 MG/1
25 TABLET ORAL DAILY
COMMUNITY

## 2023-03-10 RX ORDER — ASCORBIC ACID 250 MG
250 TABLET ORAL DAILY
COMMUNITY

## 2023-03-10 RX ORDER — VALSARTAN 320 MG/1
320 TABLET ORAL DAILY
COMMUNITY

## 2023-03-10 RX ORDER — GLUCOSAMINE SULFATE 1500 MG
POWDER IN PACKET (EA) ORAL DAILY
COMMUNITY

## 2023-03-10 RX ORDER — OMEPRAZOLE 40 MG/1
40 CAPSULE, DELAYED RELEASE ORAL 2 TIMES DAILY
COMMUNITY

## 2023-03-10 RX ORDER — ALPRAZOLAM 0.25 MG/1
0.25 TABLET ORAL AS NEEDED
COMMUNITY

## 2023-03-10 RX ORDER — METFORMIN HYDROCHLORIDE 500 MG/1
TABLET ORAL 2 TIMES DAILY WITH MEALS
COMMUNITY

## 2023-03-10 RX ORDER — BISOPROLOL FUMARATE AND HYDROCHLOROTHIAZIDE 5; 6.25 MG/1; MG/1
1 TABLET ORAL DAILY
COMMUNITY

## 2023-03-10 RX ORDER — TRAZODONE HYDROCHLORIDE 100 MG/1
100 TABLET ORAL
COMMUNITY

## 2023-03-10 NOTE — PERIOP NOTES
[address] [facility] INSTRUCTIONS  ORTHOPAEDIC    Surgery Date:   3/17/23    Your surgeon's office or [facility] staff will call you between 4 PM- 8 PM the day before surgery with your arrival time. If your surgery is on a Monday, you will receive a call the preceding Friday. Please report to [facility] Patient Access/Admitting on the 1st floor. Bring your insurance card, photo identification, and any copayment (if applicable). If you are going home the same day of your surgery, you must have a responsible adult to drive you home. You need to have a responsible adult to stay with you the first 24 hours after surgery and you should not drive a car for 24 hours following your surgery. Do NOT eat any solid foods after midnight the night before surgery including candy, mints or gum. You may drink clear liquids from midnight until 1 hour prior to arrival time. You may drink up to 12 ounces at one time every 4 hours. Do NOT drink alcohol or smoke 24 hours before surgery. STOP smoking for 14 days prior as it helps with breathing and healing after surgery. If your arrival time is 3pm or later, you may eat a light breakfast before 8am (toast, bagel-no butter, black coffee, plain tea, fruit juice-no pulp) Please note special instructions, if applicable, below for medications. If you are being admitted to the hospital,please leave personal belongings/luggage in your car until you have an assigned hospital room number. Please wear comfortable clothes. Wear your glasses instead of contacts. We ask that all money, jewelry and valuables be left at home. Wear no make up, particularly mascara, the day of surgery. All body piercings, rings, and jewelry need to be removed and left at home. Please remove any nail polish or artificial nails from your fingernails. Please wear your hair loose or down. Please no pony-tails, buns, or any metal hair accessories.  If you shower the morning of surgery, please do not apply any lotions or powders afterwards. You may wear deodorant. Do not shave any body area within 24 hours of your surgery. Please follow all instructions to avoid any potential surgical cancellation. Should your physical condition change, (i.e. fever, cold, flu, etc.) please notify your surgeon as soon as possible. It is important to be on time. If a situation occurs where you may be delayed, please call:  [phone] / [phone] on the day of surgery. The Preadmission Testing staff can be reached at [phone]. Special instructions: [address] WITH YOU TO THE HOSPITAL    Current Outpatient Medications   Medication Sig    bisoprolol-hydroCHLOROthiazide (ZIAC) 5-6.25 mg per tablet Take 1 Tablet by mouth daily. metFORMIN (GLUCOPHAGE) 500 mg tablet Take  by mouth two (2) times daily (with meals). omeprazole (PRILOSEC) 40 mg capsule Take 40 mg by mouth two (2) times a day. valsartan (DIOVAN) 320 mg tablet Take 320 mg by mouth daily. AMLODIPINE BESYLATE, BULK, 5 mg by Does Not Apply route daily. chlorthalidone (HYGROTON) 25 mg tablet Take 25 mg by mouth daily. pramipexole di-HCl (PRAMIPEXOLE PO) Take  by mouth nightly. traZODone (DESYREL) 100 mg tablet Take 100 mg by mouth nightly. ALPRAZolam (XANAX) 0.25 mg tablet Take 0.25 mg by mouth as needed for Anxiety. cholecalciferol (Vitamin D3) 25 mcg (1,000 unit) cap Take  by mouth daily. ascorbic acid, vitamin C, (Vitamin C) 250 mg tablet Take 250 mg by mouth daily. SITagliptin phosphate (JANUVIA) 50 mg tablet Take 50 mg by mouth daily. atorvastatin (LIPITOR) 20 mg tablet Take 20 mg by mouth daily. No current facility-administered medications for this encounter.        YOU MUST ONLY TAKE THESE MEDICATIONS THE MORNING OF SURGERY WITH A SIP OF WATER: BISOPROLOL, OMEPRAZOLE, AMLODIPINE    MEDICATIONS TO TAKE THE MORNING OF SURGERY ONLY IF NEEDED: ALPRAZOLAM    HOLD these prescription medications BEFORE Surgery: STOP METFORMIN ON 3/16/23; STOP ALL VITAMINS AND SUPPLEMENTS TODAY    Ask your surgeon/prescribing physician about when/if to STOP taking these medications: NONE    Stop any non-steroidal anti-inflammatory drugs (i.e. Ibuprofen, Naproxen, Advil, Aleve) 3 days before surgery. You may take Tylenol. STOP all vitamins and herbal supplements 1 week prior to  surgery. If you are currently taking Plavix, Coumadin, or any other blood-thinning/anticoagulant medication contact your prescribing physician for instructions. Preventing Infections Before and After - Your Surgery    IMPORTANT INSTRUCTIONS    You play an important role in your health and preparation for surgery. To reduce the germs on your skin you will need to shower with CHG soap (Chorhexidine gluconate 4%) two times before surgery. CHG soap (Hibiclens, Hex-A-Clens or store brand)  CHG soap will be provided at your Preadmission Testing (PAT) appointment. If you do not have a PAT appointment before surgery, you may arrange to  CHG soap from our office or purchase CHG soap at a pharmacy, grocery or department store. You need to purchase TWO 4 ounce bottles to use for your 2 showers. Steps to follow:  Maria T Dodrill your hair with your normal shampoo and your body with regular soap and rinse well to remove shampoo and soap from your skin. Wet a clean washcloth and turn off the shower. Put CHG soap on washcloth and apply to your entire body from the neck down. Do not use on your head, face or private parts(genitals). Do not use CHG soap on open sores, wounds or areas of skin irritation. Wash you body gently for 5 minutes. Do not wash your skin too hard. This soap does not create lather. Pay special attention to your underarms and from your belly button to your feet. Turn the shower back on and rinse well to get CHG soap off your body. Pat your skin dry with a clean, dry towel. Do not apply lotions or moisturizer.   Put on clean clothes and sleep on fresh bed sheets and do not allow pets to sleep with you. Shower with CHG soap 2 times before your surgery  The evening before your surgery  The morning of your surgery      Tips to help prevent infections after your surgery:  Protect your surgical wound from germs:  Hand washing is the most important thing you and your caregivers can do to prevent infections. Keep your bandage clean and dry! Do not touch your surgical wound. Use clean, freshly washed towels and washcloths every time you shower; do not share bath linens with others. Until your surgical wound is healed, wear clothing and sleep on bed linens each day that are clean and freshly washed. Do not allow pets to sleep in your bed with you or touch your surgical wound. Do not smoke - smoking delays wound healing. This may be a good time to stop smoking. If you have diabetes, it is important for you to manage your blood sugar levels properly before your surgery as well as after your surgery. Poorly managed blood sugar levels slow down wound healing and prevent you from healing completely. Prevention of Infection  Testing for Staphylococcus aureus on your skin before surgery    Staphylococcus aureus (staph) is a common bacteria that is found on the body. It normally does not cause infection on healthy skin. Before surgery, you will be tested to see if you have staph by swabbing the inside of your nose. When you have an incision with surgery, the goal is to protect that incision from infection. Removal of the staph bacteria before surgery can decrease the risk of a surgical site infection. If your nose swab is positive for staph you will be called. Your treatment will include 2 steps:  Prescription for Mupirocin ointment to be used in each nostril twice a day for 5 days. Showering with Chlorhexidine (CHG) liquid soap for 5 days prior to surgery. How to use Mupirocin ointment in your nose   the prescription from your pharmacy.  You will receive a large tube of ointment which will be big enough for all of your treatments. You will apply this ointment to each nostril 2 times a day for 5 days. Wash your hands with  gel or soap and water for 20 seconds before using ointment. Place a pea-sized amount of ointment on a cotton Q-tip. Apply ointment just inside of each nostril with the Q-tip. Do not push Q-tip or ointment deep inside you nose. Press your nostrils together and massage for a few seconds. Wash your hands with  gel or soap and water after you are finished. Do not get ointment near your eyes. If it gets into your eyes, rinse them with cool water. If you need to use nasal spray, clean the tip of the bottle with alcohol before use and do not use both at the same time. If you are scheduled for COVID testing during the 5 days, do NOT apply morning dose until after the COVID test has been performed. How to use Chlorhexidine (CHG) 4% liquid soap  Purchase an 8 ounce bottle of CHG liquid soap (Chlorhexidine 4%, Hibiclens, Hex-A-Clens or store brand) at a pharmacy or grocery store. Wash your hair with your normal shampoo and your body with regular soap and rinse well to remove shampoo and soap from your skin. Wet a clean washcloth and turn off the shower. Put CHG soap on washcloth and apply to your entire body from the neck down. Do not use on your head, face or private parts(genitals). Do not use CHG soap on open sores, wounds or areas of skin irritation. Wash your body gently for 5 minutes. Do not wash your skin too hard. This soap does not create lather. Pay special attention to your underarms and from your belly button to your feet. Turn the shower back on and rinse well to get CHG soap off your body. Pat your skin dry with a clean, dry towel. Do not apply lotions or moisturizer. Put on clean clothes and sleep on fresh bed sheets the night before surgery. Do not allow pets to sleep with you.     Eating and Drinking Before Surgery    You may eat a regular dinner at the usual time on the day before your surgery. Do NOT eat any solid foods after midnight unless your arrival time at the hospital is 3pm or later. You may drink clear liquids only from 12 midnight until 1 hours prior to your arrival time at the hospital on the day of your surgery. Do NOT drink alcohol. Clear liquids include:  Water  Fruit juices without pulp( i.e. apple juice)  Carbonated beverages  Black coffee (no cream/milk)  Tea (no cream/milk)  Gatorade  You may drink up to 12-16 ounces at one time every 4 hours between the hours of midnight and 1 hour before your arrival time at the hospital. Example- if your arrival time at the hospital is 6am, you may drink 12-16 ounces of clear liquids no later than 5am.  If your arrival time at the hospital is 3pm or later, you may eat a light breakfast before 8am.  A light breakfast includes: Toast or bagel (no butter)  Black coffee (no cream/milk)  Tea (no cream/milk)  Fruit juices without pulp ( i.e. apple juice)  Do NOT eat meat, eggs, vegetables or fruit  If you have any questions, please contact your surgeon's office. Patient Information Regarding COVID Restrictions    Day of Procedure    Please park in the parking deck or any designated visitor parking lot. Enter the facility through the Main Entrance of the hospital.  On the day of surgery, please provide the cell phone number of the person who will be waiting for you to the Patient Access representative at the time of registration. Masks are highly recommended in the hospital, but not required. Once your procedure and the immediate recovery period is completed, a nurse in the recovery area will contact your designated visitor to inform them of your room number or to otherwise review other pertinent information regarding your care. Social distancing practices are strongly encouraged in waiting areas and the cafeteria.        The patient was contacted in person. She verbalized understanding of all instructions does not  need reinforcement.

## 2023-03-17 ENCOUNTER — ANESTHESIA EVENT (OUTPATIENT)
Dept: MEDSURG UNIT | Age: 73
End: 2023-03-17
Payer: MEDICARE

## 2023-03-17 ENCOUNTER — HOSPITAL ENCOUNTER (OUTPATIENT)
Age: 73
Setting detail: OUTPATIENT SURGERY
Discharge: HOME | End: 2023-03-17
Attending: ORTHOPAEDIC SURGERY | Admitting: ORTHOPAEDIC SURGERY
Payer: MEDICARE

## 2023-03-17 ENCOUNTER — ANESTHESIA (OUTPATIENT)
Dept: MEDSURG UNIT | Age: 73
End: 2023-03-17
Payer: MEDICARE

## 2023-03-17 VITALS
BODY MASS INDEX: 29.19 KG/M2 | HEART RATE: 64 BPM | WEIGHT: 171 LBS | DIASTOLIC BLOOD PRESSURE: 59 MMHG | RESPIRATION RATE: 22 BRPM | SYSTOLIC BLOOD PRESSURE: 136 MMHG | HEIGHT: 64 IN | OXYGEN SATURATION: 99 % | TEMPERATURE: 97.7 F

## 2023-03-17 DIAGNOSIS — Z98.890 S/P ARTHROSCOPY OF RIGHT KNEE: ICD-10-CM

## 2023-03-17 DIAGNOSIS — S83.281A ACUTE LATERAL MENISCUS TEAR OF RIGHT KNEE, INITIAL ENCOUNTER: Primary | ICD-10-CM

## 2023-03-17 LAB
GLUCOSE BLD STRIP.AUTO-MCNC: 179 MG/DL (ref 65–117)
SERVICE CMNT-IMP: ABNORMAL

## 2023-03-17 PROCEDURE — 74011250636 HC RX REV CODE- 250/636: Performed by: NURSE ANESTHETIST, CERTIFIED REGISTERED

## 2023-03-17 PROCEDURE — 2709999900 HC NON-CHARGEABLE SUPPLY: Performed by: ORTHOPAEDIC SURGERY

## 2023-03-17 PROCEDURE — 77030019908 HC STETH ESOPH SIMS -A: Performed by: STUDENT IN AN ORGANIZED HEALTH CARE EDUCATION/TRAINING PROGRAM

## 2023-03-17 PROCEDURE — 77030002916 HC SUT ETHLN J&J -A: Performed by: ORTHOPAEDIC SURGERY

## 2023-03-17 PROCEDURE — 74011250637 HC RX REV CODE- 250/637: Performed by: ORTHOPAEDIC SURGERY

## 2023-03-17 PROCEDURE — 74011000250 HC RX REV CODE- 250: Performed by: NURSE ANESTHETIST, CERTIFIED REGISTERED

## 2023-03-17 PROCEDURE — 82962 GLUCOSE BLOOD TEST: CPT

## 2023-03-17 PROCEDURE — 77030008684 HC TU ET CUF COVD -B: Performed by: STUDENT IN AN ORGANIZED HEALTH CARE EDUCATION/TRAINING PROGRAM

## 2023-03-17 PROCEDURE — 77030026438 HC STYL ET INTUB CARD -A: Performed by: STUDENT IN AN ORGANIZED HEALTH CARE EDUCATION/TRAINING PROGRAM

## 2023-03-17 PROCEDURE — 77030000032 HC CUF TRNQT ZIMM -B: Performed by: ORTHOPAEDIC SURGERY

## 2023-03-17 PROCEDURE — 76060000061 HC AMB SURG ANES 0.5 TO 1 HR: Performed by: ORTHOPAEDIC SURGERY

## 2023-03-17 PROCEDURE — 76210000035 HC AMBSU PH I REC 1 TO 1.5 HR: Performed by: ORTHOPAEDIC SURGERY

## 2023-03-17 PROCEDURE — 76030000000 HC AMB SURG OR TIME 0.5 TO 1: Performed by: ORTHOPAEDIC SURGERY

## 2023-03-17 PROCEDURE — 74011250636 HC RX REV CODE- 250/636: Performed by: STUDENT IN AN ORGANIZED HEALTH CARE EDUCATION/TRAINING PROGRAM

## 2023-03-17 PROCEDURE — 77030008496 HC TBNG ARTHSC IRR S&N -B: Performed by: ORTHOPAEDIC SURGERY

## 2023-03-17 PROCEDURE — 77030006877 HC BLD SHV FLL RAD S&N -B: Performed by: ORTHOPAEDIC SURGERY

## 2023-03-17 PROCEDURE — 77030018834: Performed by: ORTHOPAEDIC SURGERY

## 2023-03-17 RX ORDER — SODIUM CHLORIDE 0.9 % (FLUSH) 0.9 %
5-40 SYRINGE (ML) INJECTION EVERY 8 HOURS
Status: DISCONTINUED | OUTPATIENT
Start: 2023-03-17 | End: 2023-03-17 | Stop reason: HOSPADM

## 2023-03-17 RX ORDER — ONDANSETRON 2 MG/ML
4 INJECTION INTRAMUSCULAR; INTRAVENOUS AS NEEDED
Status: DISCONTINUED | OUTPATIENT
Start: 2023-03-17 | End: 2023-03-17 | Stop reason: HOSPADM

## 2023-03-17 RX ORDER — SODIUM CHLORIDE 0.9 % (FLUSH) 0.9 %
5-40 SYRINGE (ML) INJECTION AS NEEDED
Status: DISCONTINUED | OUTPATIENT
Start: 2023-03-17 | End: 2023-03-17 | Stop reason: HOSPADM

## 2023-03-17 RX ORDER — SUCCINYLCHOLINE CHLORIDE 20 MG/ML
INJECTION INTRAMUSCULAR; INTRAVENOUS AS NEEDED
Status: DISCONTINUED | OUTPATIENT
Start: 2023-03-17 | End: 2023-03-17 | Stop reason: HOSPADM

## 2023-03-17 RX ORDER — LIDOCAINE HYDROCHLORIDE 10 MG/ML
0.1 INJECTION, SOLUTION EPIDURAL; INFILTRATION; INTRACAUDAL; PERINEURAL AS NEEDED
Status: DISCONTINUED | OUTPATIENT
Start: 2023-03-17 | End: 2023-03-17 | Stop reason: HOSPADM

## 2023-03-17 RX ORDER — LIDOCAINE HYDROCHLORIDE 20 MG/ML
INJECTION, SOLUTION EPIDURAL; INFILTRATION; INTRACAUDAL; PERINEURAL AS NEEDED
Status: DISCONTINUED | OUTPATIENT
Start: 2023-03-17 | End: 2023-03-17 | Stop reason: HOSPADM

## 2023-03-17 RX ORDER — FENTANYL CITRATE 50 UG/ML
25 INJECTION, SOLUTION INTRAMUSCULAR; INTRAVENOUS
Status: DISCONTINUED | OUTPATIENT
Start: 2023-03-17 | End: 2023-03-17 | Stop reason: HOSPADM

## 2023-03-17 RX ORDER — SODIUM CHLORIDE, SODIUM LACTATE, POTASSIUM CHLORIDE, CALCIUM CHLORIDE 600; 310; 30; 20 MG/100ML; MG/100ML; MG/100ML; MG/100ML
INJECTION, SOLUTION INTRAVENOUS
Status: DISCONTINUED | OUTPATIENT
Start: 2023-03-17 | End: 2023-03-17 | Stop reason: HOSPADM

## 2023-03-17 RX ORDER — ACETAMINOPHEN 325 MG/1
650 TABLET ORAL ONCE
Status: DISCONTINUED | OUTPATIENT
Start: 2023-03-17 | End: 2023-03-17 | Stop reason: HOSPADM

## 2023-03-17 RX ORDER — CEFAZOLIN SODIUM 1 G/3ML
INJECTION, POWDER, FOR SOLUTION INTRAMUSCULAR; INTRAVENOUS AS NEEDED
Status: DISCONTINUED | OUTPATIENT
Start: 2023-03-17 | End: 2023-03-17 | Stop reason: HOSPADM

## 2023-03-17 RX ORDER — SODIUM CHLORIDE, SODIUM LACTATE, POTASSIUM CHLORIDE, CALCIUM CHLORIDE 600; 310; 30; 20 MG/100ML; MG/100ML; MG/100ML; MG/100ML
50 INJECTION, SOLUTION INTRAVENOUS CONTINUOUS
Status: DISCONTINUED | OUTPATIENT
Start: 2023-03-17 | End: 2023-03-17 | Stop reason: HOSPADM

## 2023-03-17 RX ORDER — ONDANSETRON 2 MG/ML
INJECTION INTRAMUSCULAR; INTRAVENOUS AS NEEDED
Status: DISCONTINUED | OUTPATIENT
Start: 2023-03-17 | End: 2023-03-17 | Stop reason: HOSPADM

## 2023-03-17 RX ORDER — MIDAZOLAM HYDROCHLORIDE 1 MG/ML
INJECTION, SOLUTION INTRAMUSCULAR; INTRAVENOUS AS NEEDED
Status: DISCONTINUED | OUTPATIENT
Start: 2023-03-17 | End: 2023-03-17 | Stop reason: HOSPADM

## 2023-03-17 RX ORDER — METHOCARBAMOL 500 MG/1
500 TABLET, FILM COATED ORAL
Qty: 25 TABLET | Refills: 1 | Status: SHIPPED | OUTPATIENT
Start: 2023-03-17 | End: 2023-03-24

## 2023-03-17 RX ORDER — HYDROCODONE BITARTRATE AND ACETAMINOPHEN 7.5; 325 MG/1; MG/1
1 TABLET ORAL
Qty: 25 TABLET | Refills: 0 | Status: SHIPPED | OUTPATIENT
Start: 2023-03-17 | End: 2023-03-24

## 2023-03-17 RX ORDER — ROCURONIUM BROMIDE 10 MG/ML
INJECTION, SOLUTION INTRAVENOUS AS NEEDED
Status: DISCONTINUED | OUTPATIENT
Start: 2023-03-17 | End: 2023-03-17 | Stop reason: HOSPADM

## 2023-03-17 RX ORDER — DEXAMETHASONE SODIUM PHOSPHATE 4 MG/ML
INJECTION, SOLUTION INTRA-ARTICULAR; INTRALESIONAL; INTRAMUSCULAR; INTRAVENOUS; SOFT TISSUE AS NEEDED
Status: DISCONTINUED | OUTPATIENT
Start: 2023-03-17 | End: 2023-03-17 | Stop reason: HOSPADM

## 2023-03-17 RX ORDER — MIDAZOLAM HYDROCHLORIDE 1 MG/ML
1 INJECTION, SOLUTION INTRAMUSCULAR; INTRAVENOUS AS NEEDED
Status: DISCONTINUED | OUTPATIENT
Start: 2023-03-17 | End: 2023-03-17 | Stop reason: HOSPADM

## 2023-03-17 RX ORDER — ROPIVACAINE HYDROCHLORIDE 5 MG/ML
INJECTION, SOLUTION EPIDURAL; INFILTRATION; PERINEURAL
Status: COMPLETED | OUTPATIENT
Start: 2023-03-17 | End: 2023-03-17

## 2023-03-17 RX ORDER — PROPOFOL 10 MG/ML
INJECTION, EMULSION INTRAVENOUS AS NEEDED
Status: DISCONTINUED | OUTPATIENT
Start: 2023-03-17 | End: 2023-03-17 | Stop reason: HOSPADM

## 2023-03-17 RX ORDER — HYDROCODONE BITARTRATE AND ACETAMINOPHEN 5; 325 MG/1; MG/1
1 TABLET ORAL ONCE
Status: COMPLETED | OUTPATIENT
Start: 2023-03-17 | End: 2023-03-17

## 2023-03-17 RX ORDER — GUAIFENESIN 100 MG/5ML
81 LIQUID (ML) ORAL DAILY
Qty: 30 TABLET | Refills: 0 | Status: SHIPPED | OUTPATIENT
Start: 2023-03-17 | End: 2023-04-01

## 2023-03-17 RX ORDER — FENTANYL CITRATE 50 UG/ML
INJECTION, SOLUTION INTRAMUSCULAR; INTRAVENOUS AS NEEDED
Status: DISCONTINUED | OUTPATIENT
Start: 2023-03-17 | End: 2023-03-17 | Stop reason: HOSPADM

## 2023-03-17 RX ORDER — HYDROMORPHONE HYDROCHLORIDE 1 MG/ML
0.2 INJECTION, SOLUTION INTRAMUSCULAR; INTRAVENOUS; SUBCUTANEOUS
Status: DISCONTINUED | OUTPATIENT
Start: 2023-03-17 | End: 2023-03-17 | Stop reason: HOSPADM

## 2023-03-17 RX ADMIN — ONDANSETRON HYDROCHLORIDE 4 MG: 2 INJECTION, SOLUTION INTRAMUSCULAR; INTRAVENOUS at 08:07

## 2023-03-17 RX ADMIN — MIDAZOLAM 2 MG: 1 INJECTION INTRAMUSCULAR; INTRAVENOUS at 07:25

## 2023-03-17 RX ADMIN — DEXAMETHASONE SODIUM PHOSPHATE 4 MG: 4 INJECTION, SOLUTION INTRAMUSCULAR; INTRAVENOUS at 08:07

## 2023-03-17 RX ADMIN — PROPOFOL 150 MG: 10 INJECTION, EMULSION INTRAVENOUS at 08:01

## 2023-03-17 RX ADMIN — LIDOCAINE HYDROCHLORIDE 60 MG: 20 INJECTION, SOLUTION EPIDURAL; INFILTRATION; INTRACAUDAL; PERINEURAL at 08:01

## 2023-03-17 RX ADMIN — FENTANYL CITRATE 50 MCG: 50 INJECTION, SOLUTION INTRAMUSCULAR; INTRAVENOUS at 08:14

## 2023-03-17 RX ADMIN — MIDAZOLAM 2 MG: 1 INJECTION INTRAMUSCULAR; INTRAVENOUS at 07:53

## 2023-03-17 RX ADMIN — SODIUM CHLORIDE, POTASSIUM CHLORIDE, SODIUM LACTATE AND CALCIUM CHLORIDE: 600; 310; 30; 20 INJECTION, SOLUTION INTRAVENOUS at 07:50

## 2023-03-17 RX ADMIN — HYDROCODONE BITARTRATE AND ACETAMINOPHEN 1 TABLET: 5; 325 TABLET ORAL at 09:28

## 2023-03-17 RX ADMIN — ROCURONIUM BROMIDE 10 MG: 10 SOLUTION INTRAVENOUS at 08:01

## 2023-03-17 RX ADMIN — FENTANYL CITRATE 50 MCG: 50 INJECTION, SOLUTION INTRAMUSCULAR; INTRAVENOUS at 08:01

## 2023-03-17 RX ADMIN — SUCCINYLCHOLINE CHLORIDE 160 MG: 20 INJECTION, SOLUTION INTRAMUSCULAR; INTRAVENOUS at 08:01

## 2023-03-17 RX ADMIN — ROPIVACAINE HYDROCHLORIDE 20 ML: 5 INJECTION, SOLUTION EPIDURAL; INFILTRATION; PERINEURAL at 07:22

## 2023-03-17 RX ADMIN — FENTANYL CITRATE 25 MCG: 50 INJECTION, SOLUTION INTRAMUSCULAR; INTRAVENOUS at 09:10

## 2023-03-17 RX ADMIN — CEFAZOLIN 2 G: 330 INJECTION, POWDER, FOR SOLUTION INTRAMUSCULAR; INTRAVENOUS at 08:09

## 2023-03-17 NOTE — ROUTINE PROCESS
Patient: Desi Naranjo MRN: [id number]  SSN: [id number]   YOB: 1950  Age: 67 y.o. Sex: female     Patient is status post Procedure(s):  RIGHT KNEE ARTHROSCOPY WITH LATERAL MENISCECTOMY (SPINAL/REGIONAL).     Surgeon(s) and Role:     Collette Curt, MD - Primary    Local/Dose/Irrigation:  SEE MAR                  Peripheral IV 03/17/23 Left Antecubital (Active)   Site Assessment Clean, dry, & intact 03/17/23 0710   Phlebitis Assessment 0 03/17/23 0710   Infiltration Assessment 0 03/17/23 0710   Dressing Status Clean, dry, & intact 03/17/23 0710   Dressing Type Transparent 03/17/23 0710            Airway - Endotracheal Tube 03/17/23 (Active)                   Dressing/Packing:  Incision 03/17/23 Knee Right-Dressing/Treatment: Ace wrap;Cast padding;Gauze dressing/dressing sponge;Xeroform (03/17/23 0843)    Splint/Cast:  ]    Other:

## 2023-03-17 NOTE — INTERVAL H&P NOTE
Update History & Physical    The Patient's History and Physical of March 16, 2023 was reviewed with the patient and I examined the patient. There was no change. The surgical site was confirmed by the patient and me. Plan:  The risk, benefits, expected outcome, and alternative to the recommended procedure have been discussed with the patient. Patient understands and wants to proceed with the procedure.     Electronically signed by Michelle Lee MD on 3/17/2023 at 7:40 AM

## 2023-03-17 NOTE — DISCHARGE INSTRUCTIONS
Dr. Hiwot Urbano Knee Arthroscopy Postoperative Instructions  Follow-Up Appointment:  Follow up in the office 2 weeks after surgery. If this appointment is not already scheduled, please call our office at [phone]. Activity:  Ambulate every hour. Use your incentive spirometer every half hour when resting. Perform your exercises as often as possible, at least 3 times a day. You may bear weight fully on your operative extremity and transition away from a walking device as soon as you feel comfortable and strong enough. That being said, advance your activity in a stepwise and cautious manner. You will likely feel better than your knee is ready for. Advance your activity as tolerated. Prevent any falls. Clear your living environment of rugs or floor objects that may be tripping hazards. Use an assistive device as needed for balance and support. Refrain from any high-level activities until we see you back at your follow up appointment. This includes golfing, exercising and other more intense activities. Application of the ice packs will prevent and treat inflammation and reduce pain and swelling. You should ice the incisional area at least 3x/day, for 20-30 minutes. Dressings / Wound Care: It is OK to remove Ace wrap and cast padding after 3 days. Replace the ace wrap with clean, sterile dressing underneath. Keep dressing in place until the follow-up visit. Do not apply antibiotic ointment, creams or lotions to your incision. the sutures or staples will be removed at your 2 week follow up. Drainage from the incision more than 4 days from surgery is concerning. If there is any question, contact my office at [phone]. Steps to take for knee drainage:   Keep the knee fully extended, avoid knee flexion beyond 45 degrees until the drainage stops.    Apply gauze dressing over the incision followed by an ace-wrap applied snugly  Hold physical therapy   If the wound continues to drain after 4-6 hours, call the office and hold all blood thinners. Romy Pancoast / Bathing: If your incision is dry without drainage, you may shower after 3 days. It is fine to have water run over the incision after the waterproof dressing has been removed. Do not vigorously scrub your incision. Do not soak or submerge in a bath, pool, jacuzzi, ocean, river or lake for 6 weeks after surgery. Diet:  You may advance to your regular diet as tolerated. Proper nutrition is crucial to healing. Make sure you are eating as healthily as possible and following a balanced. protein-rich diet after your surgery. Avoid processed foods and eat fruits and vegetables. Medications: It is our goal to keep you as comfortable as possible after your surgery. Please take your medications as prescribed without exceeding the recommended dosage. It is also important to understand that pain has a cycle. It begins and increases until medication interrupts it. The aim of good pain control is to stop the pain before it becomes intolerable. The key is to stay ahead of the pain. We encourage patients to discontinue opioid pain medications as soon as possible. The side effects of these medications can be substantial, and the narcotic medications are not mandatory. You may substitute a prescribed narcotic with over-the-counter Tylenol. Tylenol should not exceed 4000mg in a 24 hour period, including if it is in the pain medication. Pain medications may cause constipation. Over-the-counter Colace twice daily and Miralax while taking the narcotic medication should help prevent constipation. Other side effects of pain medication include dizziness, headache, nausea, vomiting, and urinary retention. Discontinue the pain medication if you develop itching, rash, shortness of breath, or difficulties swallowing. If these symptoms become severe or are not relieved by discontinuing the medication, you should seek immediate medical attention.   Refills of pain medication are authorized during office hours only (8 AM- 4 PM Monday through Friday). Our office will not prescribe narcotics beyond 6 weeks from the date of your surgery. Narcotics will not be called into the pharmacy. In most cases, you must be seen. Blood Thinners: You will be given a prescription for either Aspirin or Xarelto based on your health history. If you are on a blood thinner before surgery, it will be resumed. You should take these medications as prescribed for 4 weeks following surgery. Driving: You should not return to driving until you are off all narcotic pain medications and able to safely and quickly apply the brakes. This is normally 2-6 weeks for right-sided knee surgery. Signs/Symptoms of Concern:   Contact Dr. Kae Steen office if any of the following signs or symptoms develop. Please be advised if a problem arises which you feel requires immediate medical attention you should seek medical attention at the closest ER. Temperature greater than 101.5 for more than 8 hrs  Fever and/or chills that persist for greater than 8 hrs  A sudden increase in pain/swelling/ or tenderness in the back of your calf or thigh that is not relieved with ice/elevation and pain medication. Increased drainage from your incision, increased redness or warmth at the area of your incision or a sudden increase in swelling or redness that persists despite ice and elevation      If you have questions or concerns, please call Dr. Kae Steen staff    Office: Phone [phone]  Fax [phone]    Office Address: Aydee Mcnair M.D.     [address] [place] [address]    Aydee Mcnair MD        DISCHARGE SUMMARY from Nurse    PATIENT INSTRUCTIONS:    After general anesthesia or intravenous sedation, for 24 hours or while taking prescription Narcotics:  Limit your activities  Do not drive and operate hazardous machinery  Do not make important personal or business decisions  Do  not drink alcoholic beverages  If you have not urinated within 8 hours after discharge, please contact your surgeon on call. Report the following to your surgeon:  Excessive pain, swelling, redness or odor of or around the surgical area  Temperature over 100.5  Nausea and vomiting lasting longer than 4 hours or if unable to take medications  Any signs of decreased circulation or nerve impairment to extremity: change in color, persistent  numbness, tingling, coldness or increase pain  Any questions    What to do at Home:  Recommended activity: Activity as tolerated and no driving for today or while taking narcotics. If you experience any of the above symptoms, please follow up with Dr Jaky Wright. .    *  Please give a list of your current medications to your Primary Care Provider. *  Please update this list whenever your medications are discontinued, doses are      changed, or new medications (including over-the-counter products) are added. *  Please carry medication information at all times in case of emergency situations. These are general instructions for a healthy lifestyle:    No smoking/ No tobacco products/ Avoid exposure to second hand smoke  Surgeon General's Warning:  Quitting smoking now greatly reduces serious risk to your health. Obesity, smoking, and sedentary lifestyle greatly increases your risk for illness    A healthy diet, regular physical exercise & weight monitoring are important for maintaining a healthy lifestyle    You may be retaining fluid if you have a history of heart failure or if you experience any of the following symptoms:  Weight gain of 3 pounds or more overnight or 5 pounds in a week, increased swelling in our hands or feet or shortness of breath while lying flat in bed. Please call your doctor as soon as you notice any of these symptoms; do not wait until your next office visit. The discharge information has been reviewed with the spouse.   The spouse verbalized understanding. Discharge medications reviewed with the spouse and appropriate educational materials and side effects teaching were provided.   ___________________________________________________________________________________________________________________________________    03/16/23

## 2023-03-17 NOTE — ANESTHESIA POSTPROCEDURE EVALUATION
Post-Anesthesia Evaluation and Assessment    Patient: Sarkis Daniel MRN: [id number]  SSN: [id number]    YOB: 1950  Age: 67 y.o. Sex: female      I have evaluated the patient and they are stable and ready for discharge from the PACU. Cardiovascular Function/Vital Signs  Visit Vitals  BP (!) 146/76   Pulse 65   Temp 36.5 °C (97.7 °F)   Resp 22   Ht 5' 4\" (1.626 m)   Wt 77.6 kg (171 lb)   SpO2 96%   BMI 29.35 kg/m²       Patient is status post Spinal anesthesia for Procedure(s):  RIGHT KNEE ARTHROSCOPY WITH LATERAL MENISCECTOMY (SPINAL/REGIONAL). Nausea/Vomiting: None    Postoperative hydration reviewed and adequate. Pain:  Managed    Neurological Status: At baseline    Mental Status, Level of Consciousness: Alert and  oriented to person, place, and time    Pulmonary Status:   Adequate oxygenation and airway patent    Complications related to anesthesia: None    Post-anesthesia assessment completed. No concerns    Signed By: So Coker DO     March 17, 2023                Procedure(s):  RIGHT KNEE ARTHROSCOPY WITH LATERAL MENISCECTOMY (SPINAL/REGIONAL). general, regional    <BSHSIANPOST>    INITIAL Post-op Vital signs:   Vitals Value Taken Time   /59 03/17/23 0955   Temp 36.5 °C (97.7 °F) 03/17/23 0903   Pulse 65 03/17/23 0955   Resp 21 03/17/23 0955   SpO2 98 % 03/17/23 0955   Vitals shown include unvalidated device data.

## 2023-03-17 NOTE — ANESTHESIA PROCEDURE NOTES
Peripheral Block    Start time: 3/17/2023 7:20 AM  End time: 3/17/2023 7:22 AM  Performed by: Keaton Gomse DO  Authorized by: Keaton Gomes DO       Pre-procedure: Indications: at surgeon's request and post-op pain management    Preanesthetic Checklist: patient identified, risks and benefits discussed, site marked, timeout performed and patient being monitored      Block Type:   Block Type:   Adductor canal  Laterality:  Right  Monitoring:  Standard ASA monitoring, continuous pulse ox, frequent vital sign checks, heart rate, responsive to questions and oxygen  Injection Technique:  Single shot  Procedures: ultrasound guided    Patient Position: supine  Prep: chlorhexidine    Location:  Mid thigh  Needle Type:  Stimuplex  Needle Gauge:  21 G  Needle Localization:  Ultrasound guidance and anatomical landmarks  Medication Injected:  Ropivacaine (PF) (NAROPIN)(0.5%) 5 mg/mL injection - Peripheral Nerve Block   20 mL - 3/17/2023 7:22:00 AM  Med Admin Time: 3/17/2023 7:22 AM    Assessment:  Number of attempts:  1  Injection Assessment:  Incremental injection every 5 mL, local visualized surrounding nerve on ultrasound, negative aspiration for blood, no paresthesia and no intravascular symptoms  Patient tolerance:  Patient tolerated the procedure well with no immediate complications

## 2023-03-17 NOTE — ANESTHESIA PREPROCEDURE EVALUATION
Relevant Problems   No relevant active problems       Anesthetic History   No history of anesthetic complications            Review of Systems / Medical History  Patient summary reviewed, nursing notes reviewed and pertinent labs reviewed    Pulmonary  Within defined limits                 Neuro/Psych         Psychiatric history     Cardiovascular    Hypertension          CAD and hyperlipidemia    Exercise tolerance: >4 METS     GI/Hepatic/Renal     GERD           Endo/Other    Diabetes    Arthritis     Other Findings              Physical Exam    Airway  Mallampati: II  TM Distance: 4 - 6 cm  Neck ROM: normal range of motion   Mouth opening: Normal     Cardiovascular    Rhythm: regular  Rate: normal         Dental  No notable dental hx       Pulmonary  Breath sounds clear to auscultation               Abdominal  GI exam deferred       Other Findings            Anesthetic Plan    ASA: 2  Anesthesia type: general and regional          Induction: Intravenous  Anesthetic plan and risks discussed with: Patient

## 2023-03-17 NOTE — H&P
CARE TEAM:  Patient Care Team:  Julián Bocanegra MD as PCP - General (Family Medicine)    Note: This chart was prepared using voice-recognition software and may contain unintended word substitution errors. An addendum for corrections can be made upon request.     ASSESSMENT  Diagnosis Plan   1. Tear of lateral meniscus of right knee, current, unspecified tear type, initial encounter acetaminophen-codeine (TYLENOL #3) 300-30 MG per tablet     2. Primary osteoarthritis of right knee       There is no problem list on file for this patient. PLAN  Treatment Plan:   -We discussed continued non-operative management options but the patient has failed conservative managements and would like to proceed forth with surgical treatment.  -We discussed arthroscopic partial meniscectomy (87931) versus total knee arthroplasty. Currently with her x-ray imaging, I recommended we worsening the arthroscopic procedure. She may require knee replacement in the future. It would be an option to do a knee replacement now, but think it would be able to help her with just a meniscectomy. We discussed the risks, benefits, complications and convalescence. We discussed stiffness, bleeding, infection, continued pain, neurovascular injury, thromboembolic disease and anesthetic complications. The patient agreed to proceed with surgery.  -The patient was given preoperative clearance instructions.  -I asked her to obtain the MRI imaging and bring them to the office so that we can uploaded to our system for review  - Planning Right knee arthroscopic partial lateral meniscectomy     Orders Placed This Encounter   acetaminophen-codeine (TYLENOL #3) 300-30 MG per tablet     Return for Post-Op Check. HISTORY OF PRESENT ILLNESS  Chief Complaint: Pain of the Right Knee    Age: 67 y.o. Sex: female   Hand-dominance: right    History of present illness: Ms. Hope Sharif presents today for evaluation of right knee pain. Symptoms began about 4 months ago.  She denies a specific accident, event or injury that began the pain. She has had 2 injections, 1 in October 1 on 02/01/2023. Injections have not provided any relief. She has an MRI showing lateral meniscus tear and also some cartilage injury throughout the knee is not evident on x-ray. She has also tried Tylenol anti-inflammatory. She cannot try other anti-inflammatories because of stomach problems.     Pain rating = 10-Worst pain ever out of 10     Past Medical History:   Diagnosis Date   Anxiety   Chronic kidney disease   Diabetes mellitus   GERD (gastroesophageal reflux disease)   Gout   Hypertension     Past Surgical History:   Procedure Laterality Date   FOOT SURGERY   SPINE SURGERY     Current Outpatient Medications:   albuterol HFA (PROVENTIL HFA;VENTOLIN HFA) 108 (90 Base) MCG/ACT inhaler, INHALE 2 PUFFS BY MOUTH EVERY 4 HOURS AS NEEDED FOR SHORTNESS OF BREATH, Disp: , Rfl:   ALPRAZolam (XANAX) 0.25 MG tablet, Take 0.25 mg by mouth nightly as needed for anxiety, Disp: , Rfl:   amLODIPine-atorvastatin (CADUET) 5-10 MG per tablet, Take 1 tablet by mouth daily, Disp: , Rfl:   atorvastatin (LIPITOR) 20 MG tablet, Take 20 mg by mouth daily, Disp: , Rfl:   atorvastatin (LIPITOR) 20 MG tablet, Take 20 mg by mouth daily, Disp: , Rfl:   Baclofen 5 MG tablet, baclofen 5 mg tablet TK 2 TS PO QD HS, Disp: , Rfl:   benazepril-hydrochlorthiazide (LOTENSIN HCT) 10-12.5 MG per tablet, Take 1 tablet by mouth daily, Disp: , Rfl:   benzonatate (TESSALON) 100 MG capsule, Take 100 mg by mouth 3 (three) times a day, Disp: , Rfl:   eszopiclone (LUNESTA) 2 MG tablet, Take 2 mg by mouth daily, Disp: , Rfl:   fluorouracil (EFUDEX) 5 % cream, fluorouracil 5 % topical cream KATIE EXT AA ONCE D TO FACE AND UPPER LIP, Disp: , Rfl:   ketorolac (ACULAR) 0.5 % ophthalmic solution, ketorolac 0.5 % eye drops INT 1 GTT IN OD QID, Disp: , Rfl:   lidocaine (LIDODERM) 5 % patch, lidocaine 5 % topical patch, Disp: , Rfl:   Melatonin 10 MG capsule, TAKE ONE CAPSULE BY MOUTH 1 HOUR BEFORE BEDTIME, Disp: , Rfl:   meloxicam (MOBIC) 7.5 MG tablet, Take 1 tablet (7.5 mg total) by mouth daily as needed for moderate pain or mild pain, Disp: 30 tablet, Rfl: 0  metFORMIN (GLUCOPHAGE) 500 MG tablet, Take 500 mg by mouth 2 (two) times a day with meals, Disp: , Rfl:   nortriptyline (PAMELOR) 25 MG capsule, Take 25 mg by mouth daily, Disp: , Rfl:   omeprazole (PriLOSEC) 40 MG capsule, Take 40 mg by mouth daily, Disp: , Rfl:   pramipexole (MIRAPEX) 1 MG tablet, Take 1 mg by mouth 3 (three) times a day, Disp: , Rfl:   prednisoLONE acetate (PRED FORTE) 1 % ophthalmic suspension, prednisolone acetate 1 % eye drops,suspension SHAKE LQ AND INT 1 GTT IN OS QID, Disp: , Rfl:   predniSONE (DELTASONE) 50 MG tablet, Take 50 mg by mouth once daily, Disp: , Rfl:   sertraline (ZOLOFT) 100 MG tablet, Take 100 mg by mouth daily, Disp: , Rfl:   sitaGLIPtin (JANUVIA) 50 MG tablet, Januvia 50 mg tablet TAKE 1 TABLET BY MOUTH EVERY DAY, Disp: , Rfl:   telmisartan (MICARDIS) 20 MG tablet, Take by mouth daily, Disp: , Rfl:   tiZANidine (ZANAFLEX) 2 MG tablet, tizanidine 2 mg tablet TAKE 1 TABLET BY MOUTH EVERY 8 HOURS AS NEEDED, Disp: , Rfl:   valsartan (DIOVAN) 320 MG tablet, Take 320 mg by mouth daily, Disp: , Rfl:   valsartan (DIOVAN) 320 MG tablet, Take 320 mg by mouth daily, Disp: , Rfl:   acetaminophen-codeine (TYLENOL #3) 300-30 MG per tablet, Take 1 tablet by mouth every 6 (six) hours as needed for moderate pain or mild pain for up to 7 days, Disp: 30 tablet, Rfl: 0     Family History   Problem Relation Age of Onset   Diabetes Mother   Diabetes Father       Social History     Socioeconomic History   Marital status:    Spouse name: Not on file   Number of children: Not on file   Years of education: Not on file   Highest education level: Not on file   Occupational History   Not on file   Tobacco Use   Smoking status: Never   Smokeless tobacco: Never   Substance and Sexual Activity   Alcohol use: Never   Drug use: Never   Sexual activity: Not on file   Other Topics Concern   Not on file   Social History Narrative   Not on file     Social Determinants of Health     Financial Resource Strain: Not on file   Food Insecurity: Not on file   Transportation Needs: Not on file   Physical Activity: Not on file   Stress: Not on file   Social Connections: Not on file   Intimate Partner Violence: Not on file   Housing Stability: Not on file       OBJECTIVE  Constitutional: No acute distress. Her body mass index is 29.18 kg/m². Eyes: Sclera are nonicteric. Respiratory: No labored breathing. Cardiovascular: No marked edema. Skin: No marked skin ulcers. Neurological: No marked sensory loss noted. Psychiatric: Alert and oriented x3. Right Knee Exam     Tests   Claudio: Medial - positive Lateral - positive  Drawer: Anterior - normal     Knee Musculoskeletal Exam  Gait  Gait is normal.    Inspection  Right  Erythema: none   Effusion: none   Edema: none   Ecchymosis: none   Deformity: none   Alignment: normal   Previous incision: no previous incision    Palpation  Right  Increased warmth: none  Masses: none   Crepitus: none   Tenderness: present   Lateral joint line: moderate   Lateral retinaculum: moderate   Medial hamstring: moderate   Medial joint line: mild   Palpation additional comments: Positive Flexion Pinch. Moderate Baker's cyst posteriorly. Range of Motion  Right  Active extension: 10  Active flexion: 120  Left  Active extension: 0  Active flexion: 125    Strength  Right  Extension: 5/5. Flexion: 5/5.      Instability  Right  Varus stress grade: normal  Valgus stress grade: normal  Anterior drawer: normal  Medial Claudio test: positive  Lateral Claudio test: positive    Neurovascular  Right  Right knee neurovascular exam is normal.     Special Signs  Right  Straight leg raise: normal  Patellar compression: none   Patellar apprehension: none     General   Constitutional: appears stated age, well-developed and well-nourished  Scleral icterus: no  Labored breathing: no  Psychiatric: normal mood and affect  Neurological: alert and oriented x3  Skin: intact    IMAGING / STUDIES  I have independently reviewed our previously obtained diagnostic imaging from 10/26/23:  4 view xrays(AP/lateral/Tunnel/sunrise) of the right knee show osteoarthritis with mild joint space narrowing and osteophyte formation. The imaging correlates to a Kellgren Oscar Grade 1. There are no fractures or other abnormalities present. MRI obtained at [facility] on 01/30/2023: Menisci:   Medial: Intact   Lateral: Tear at the femoral articular surface of the body/anterior horn. Cruciate ligaments:   ACL: Intact   PCL: Intact     Collateral ligaments:   MCL: Intact   LCL complex: The fibulocollateral ligament is intact. Biceps femoris and popliteus tendons are intact. Iliotibial band is intact. Bones: Mild tricompartmental degenerative change. No fracture. No aggressive marrow replacing process. The TT-TG interval measures 12 mm. Cartilage: There is moderate grade partial-thickness chondrosis at the median ridge and medial patellar facet. There is small subchondral cysts at the lateral patellar facet. These moderate partial-thickness chondrosis over the mid weightbearing lateral femoral condyle. There is full-thickness or near full-thickness chondrosis over the posterior   half of the lateral tibial plateau. The medial tibiofemoral compartment cartilage is diffusely thin but without a full-thickness cartilage defect. Radiologist read impression: 1. Tear of the lateral meniscus. Mild tricompartmental osteoarthritis. 2. Cartilage loss is most pronounced in full-thickness or near full-thickness of the posterior aspect lateral tibial plateau. Three.  Joint effusion and Baker's cyst.    PROCEDURES  Procedures    Dakota Lorenzo MD

## 2023-03-17 NOTE — OP NOTES
Operative Report    Patient Name: Trav Hollis    Patient YOB: 1950    Patient[facility] MRN: [id number]    Date of Procedure: 03/17/23    Surgical Location: [facility] surgery unit    Pre-operative Diagnosis: Right knee lateral meniscus tear    Post-operative Diagnosis: Right knee lateral meniscus tear, osteoarthritis    Procedure: Right knee arthroscopic partial lateral meniscectomy    Surgeon: Richa Herring MD    Assistant/Staff: Circ-1: Lamar Contreras RN  Scrub Tech-1: Siri Delgado    Anesthesia: General    Preoperative IV Antibiotics: Ancef 2g    Estimated Blood Loss: 10mL    Tourniquet Time: 24 minutes at 250mmHg    Implants: None    Drains: None    Findings: Lateral meniscus tear. Grade 3 cartilage changes of the trochlea onto the medial femoral condyle. Grade 4 college of cartilage in the lateral femoral condyle. Specimens: None    Complications: None    Disposition: PACU - hemodynamically stable. Condition: stable      Indications for procedure:  Mrs. Marly Kumar is a 39-year-old female who had presented to our office complaining of knee pain. Evaluation including MRI showed a lateral meniscus tear and some cartilage loss. She tried nonoperative management including injections which were ineffective. We talked about the neck step in management including arthroscopic debridement. She agreed to proceed forth with surgery. Description of procedure: The patient was met in the preoperative holding area. The appropriate surgical site was marked. The patient signed a consent form to proceed with surgery. She was given an adductor canal block the patient was brought into the operating room and given general anesthesia. The patient was placed on an operating room table. A tourniquet was applied to the right thigh. The leg oates was used. The right knee was prepped and draped in the usual sterile fashion.  A multidisciplinary timeout was performed and IV antibiotics were dosed. An Esmarch was used to exsanguinate the leg. Intra-Operative photography was used to give the patient a labeled copy of their images at follow-up. I began by making a lateral parapatellar portal site and entered the suprapatellar pouch. There was extensive fraying of the a fascial band over the medial femoral condyle. There was mild osteoarthritis of the inferior pole of the patella. The trochlea showed grade 3 Cartilage changes over the trochlea and superior aspect of the medial femoral condyle. The gutters were inspected and no free-floating bodies were found. Performed a debridement of the fat pad and plical tissue to enhance visualization. I entered the medial joint space and created a medial working portal under direct visualization. The medial meniscus was intact without signs of tears. The tibia showed grade 1-2 cartilage changes. The ACL and PCL were inspected and found to be intact. I entered the lateral compartment and found a radial tear at the midportion and fraying of the posterior portion of the lateral meniscus. I used a biter and a shaver in order to remove all the unhealthy, torn tissue back to normal-appearing levels. In total I removed approximately 20% of the lateral meniscus. The lateral femoral condyle had a notch of missing cartilage greater than 1 cm in size. Surrounding cartilage appeared okay. The knee was irrigated with normal saline and the tourniquet deflated. Suction was used to remove all intra-articular fluid. The portal sites were closed using 4-0 nylon suture and arthroscopic portal knot fashion. The wound was covered with Xeroform, sterile gauze, ABD, Webril and Ace wrap. She was awakened from anesthesia and brought to the PACU in stable condition. All surgical counts were correct at the end of the procedure. Postoperative Plan:  The right leg is weightbearing as tolerated. She will remove the dressing in 3 days.   She will follow-up in the office in 10-14 days.       Sergio Wheat MD      03/17/23

## 2024-01-30 ENCOUNTER — TELEPHONE (OUTPATIENT)
Age: 74
End: 2024-01-30

## 2024-01-31 ENCOUNTER — TELEPHONE (OUTPATIENT)
Age: 74
End: 2024-01-31

## 2024-01-31 DIAGNOSIS — Z12.31 SCREENING MAMMOGRAM, ENCOUNTER FOR: Primary | ICD-10-CM

## 2024-01-31 NOTE — TELEPHONE ENCOUNTER
Spoke with the patient and advised her she will need to be seen first.  Per Dr Joya, appointment scheduled on 02/06/24 at 1:15 for the [facility].

## 2024-01-31 NOTE — TELEPHONE ENCOUNTER
Pt called on 1/31/24 and lvm requesting to schedule a appt. I returned the pt's call on 1/31/24 @11:57 am. No answer, lvm asking pt to return call.

## 2024-01-31 NOTE — TELEPHONE ENCOUNTER
Pt stated \"I have a yeast infection and would like a sooner appt or something called in to my pharmacy.\" Callback requested.

## 2024-01-31 NOTE — TELEPHONE ENCOUNTER
Voicemail left on 1/31 at 10:32 AM. Patient would like a refill for her yeast infection and schedule an appointment     Returned her call on 1/31 at 10:38 AM. Left a voicemail regarding her appointment and forwarded her message requesting a refill.

## 2024-01-31 NOTE — TELEPHONE ENCOUNTER
Patient has left 3 additional voicemail's requesting to schedule an appointment. I have returned her call at 11:13 and left another voicemail. For some reason our calls are going straight to her mailbox.

## 2024-02-06 ENCOUNTER — OFFICE VISIT (OUTPATIENT)
Age: 74
End: 2024-02-06
Payer: MEDICARE

## 2024-02-06 VITALS
HEART RATE: 80 BPM | HEIGHT: 64 IN | RESPIRATION RATE: 18 BRPM | BODY MASS INDEX: 28.51 KG/M2 | WEIGHT: 167 LBS | OXYGEN SATURATION: 97 % | SYSTOLIC BLOOD PRESSURE: 134 MMHG | DIASTOLIC BLOOD PRESSURE: 70 MMHG

## 2024-02-06 DIAGNOSIS — N76.2 ACUTE VULVITIS: ICD-10-CM

## 2024-02-06 DIAGNOSIS — Z12.72 SCREENING FOR MALIGNANT NEOPLASM OF VAGINA AFTER TOTAL HYSTERECTOMY: ICD-10-CM

## 2024-02-06 DIAGNOSIS — Z12.39 SCREENING BREAST EXAMINATION: ICD-10-CM

## 2024-02-06 DIAGNOSIS — Z01.419 GYNECOLOGIC EXAM NORMAL: Primary | ICD-10-CM

## 2024-02-06 DIAGNOSIS — Z90.710 SCREENING FOR MALIGNANT NEOPLASM OF VAGINA AFTER TOTAL HYSTERECTOMY: ICD-10-CM

## 2024-02-06 PROCEDURE — G8427 DOCREV CUR MEDS BY ELIG CLIN: HCPCS | Performed by: OBSTETRICS & GYNECOLOGY

## 2024-02-06 PROCEDURE — G8419 CALC BMI OUT NRM PARAM NOF/U: HCPCS | Performed by: OBSTETRICS & GYNECOLOGY

## 2024-02-06 PROCEDURE — G0101 CA SCREEN;PELVIC/BREAST EXAM: HCPCS | Performed by: OBSTETRICS & GYNECOLOGY

## 2024-02-06 RX ORDER — NORTRIPTYLINE HYDROCHLORIDE 25 MG/1
25 CAPSULE ORAL NIGHTLY
COMMUNITY

## 2024-02-06 RX ORDER — PRAMIPEXOLE DIHYDROCHLORIDE 1 MG/1
1 TABLET ORAL 3 TIMES DAILY
COMMUNITY

## 2024-02-06 RX ORDER — CLOBETASOL PROPIONATE 0.5 MG/G
OINTMENT TOPICAL
Qty: 30 G | Refills: 0 | Status: SHIPPED | OUTPATIENT
Start: 2024-02-06

## 2024-02-09 LAB
., LABCORP: NORMAL
CYTOLOGIST CVX/VAG CYTO: NORMAL
CYTOLOGY CVX/VAG DOC CYTO: NORMAL
CYTOLOGY CVX/VAG DOC THIN PREP: NORMAL
DX ICD CODE: NORMAL
Lab: NORMAL
OTHER STN SPEC: NORMAL
STAT OF ADQ CVX/VAG CYTO-IMP: NORMAL

## 2024-02-21 NOTE — PROGRESS NOTES
Kasia Campbell is a 73 y.o. female, No obstetric history on file., No LMP recorded. Patient has had a hysterectomy., who presents today for the following:  Chief Complaint   Patient presents with   • Annual Exam        Allergies   Allergen Reactions   • Acetaminophen-Codeine Other (See Comments)     \"TERRIBLE HEADACHE\"   • Sulfa Antibiotics Anxiety       Current Outpatient Medications   Medication Sig Dispense Refill   • nortriptyline (PAMELOR) 25 MG capsule Take 1 capsule by mouth nightly     • pramipexole (MIRAPEX) 1 MG tablet Take 1 tablet by mouth 3 times daily     • clobetasol (TEMOVATE) 0.05 % ointment Apply topically 2 times daily. 30 g 0   • ALPRAZolam (XANAX) 0.25 MG tablet Take 1 tablet by mouth as needed.     • ascorbic acid (VITAMIN C) 250 MG tablet Take 1 tablet by mouth daily     • bisoprolol-hydroCHLOROthiazide (ZIAC) 5-6.25 MG per tablet Take 1 tablet by mouth daily     • chlorthalidone (HYGROTON) 25 MG tablet Take 1 tablet by mouth daily     • vitamin D 25 MCG (1000 UT) CAPS Take by mouth daily     • metFORMIN (GLUCOPHAGE) 500 MG tablet Take by mouth 2 times daily (with meals)     • omeprazole (PRILOSEC) 40 MG delayed release capsule Take 1 capsule by mouth 2 times daily     • traZODone (DESYREL) 100 MG tablet Take 1 tablet by mouth nightly     • valsartan (DIOVAN) 320 MG tablet Take 1 tablet by mouth daily     • empagliflozin (JARDIANCE) 25 MG tablet 1 tablet (Patient not taking: Reported on 2/6/2024)     • aspirin 81 MG chewable tablet Take 81 mg by mouth daily     • atorvastatin (LIPITOR) 20 MG tablet Take 20 mg by mouth daily (Patient not taking: Reported on 2/6/2024)     • SITagliptin (JANUVIA) 50 MG tablet Take 50 mg by mouth daily (Patient not taking: Reported on 2/6/2024)       No current facility-administered medications for this visit.         Past Medical History:   Diagnosis Date   • Arthritis    • CAD (coronary artery disease)     hyperlipidemia   • Chronic kidney disease     FOLLOWING

## 2024-10-01 DIAGNOSIS — N89.8 VAGINAL ITCHING: Primary | ICD-10-CM

## 2024-10-01 RX ORDER — FLUCONAZOLE 150 MG/1
TABLET ORAL
Qty: 2 TABLET | Refills: 0 | Status: SHIPPED | OUTPATIENT
Start: 2024-10-01

## 2025-04-15 ENCOUNTER — HOSPITAL ENCOUNTER (OUTPATIENT)
Facility: HOSPITAL | Age: 75
Discharge: HOME | End: 2025-04-18
Payer: MEDICARE

## 2025-04-15 VITALS
SYSTOLIC BLOOD PRESSURE: 119 MMHG | BODY MASS INDEX: 25.38 KG/M2 | WEIGHT: 152.34 LBS | HEART RATE: 76 BPM | DIASTOLIC BLOOD PRESSURE: 61 MMHG | OXYGEN SATURATION: 98 % | RESPIRATION RATE: 18 BRPM | TEMPERATURE: 98.3 F | HEIGHT: 65 IN

## 2025-04-15 LAB
ABO + RH BLD: NORMAL
ANION GAP SERPL CALC-SCNC: 6 MMOL/L (ref 2–12)
APPEARANCE UR: CLEAR
BACTERIA URNS QL MICRO: ABNORMAL /HPF
BILIRUB UR QL: NEGATIVE
BLOOD GROUP ANTIBODIES SERPL: NORMAL
BUN SERPL-MCNC: 20 MG/DL (ref 6–20)
BUN/CREAT SERPL: 22 (ref 12–20)
CALCIUM SERPL-MCNC: 10 MG/DL (ref 8.5–10.1)
CHLORIDE SERPL-SCNC: 97 MMOL/L (ref 97–108)
CO2 SERPL-SCNC: 30 MMOL/L (ref 21–32)
COLOR UR: ABNORMAL
CREAT SERPL-MCNC: 0.9 MG/DL (ref 0.55–1.02)
EKG ATRIAL RATE: 73 BPM
EKG DIAGNOSIS: NORMAL
EKG P AXIS: 36 DEGREES
EKG P-R INTERVAL: 144 MS
EKG Q-T INTERVAL: 406 MS
EKG QRS DURATION: 82 MS
EKG QTC CALCULATION (BAZETT): 447 MS
EKG R AXIS: 13 DEGREES
EKG T AXIS: 28 DEGREES
EKG VENTRICULAR RATE: 73 BPM
EPITH CASTS URNS QL MICRO: ABNORMAL /LPF
ERYTHROCYTE [DISTWIDTH] IN BLOOD BY AUTOMATED COUNT: 12.1 % (ref 11.5–14.5)
EST. AVERAGE GLUCOSE BLD GHB EST-MCNC: 134 MG/DL
GLUCOSE SERPL-MCNC: 124 MG/DL (ref 65–100)
GLUCOSE UR STRIP.AUTO-MCNC: NEGATIVE MG/DL
HBA1C MFR BLD: 6.3 % (ref 4–5.6)
HCT VFR BLD AUTO: 32.7 % (ref 35–47)
HGB BLD-MCNC: 10.9 G/DL (ref 11.5–16)
HGB UR QL STRIP: NEGATIVE
INR PPP: 0.9 (ref 0.9–1.1)
KETONES UR QL STRIP.AUTO: ABNORMAL MG/DL
LEUKOCYTE ESTERASE UR QL STRIP.AUTO: NEGATIVE
MCH RBC QN AUTO: 33.3 PG (ref 26–34)
MCHC RBC AUTO-ENTMCNC: 33.3 G/DL (ref 30–36.5)
MCV RBC AUTO: 100 FL (ref 80–99)
NITRITE UR QL STRIP.AUTO: NEGATIVE
NRBC # BLD: 0 K/UL (ref 0–0.01)
NRBC BLD-RTO: 0 PER 100 WBC
PH UR STRIP: 7.5 (ref 5–8)
PLATELET # BLD AUTO: 261 K/UL (ref 150–400)
PMV BLD AUTO: 9.8 FL (ref 8.9–12.9)
POTASSIUM SERPL-SCNC: 4.7 MMOL/L (ref 3.5–5.1)
PROT UR STRIP-MCNC: NEGATIVE MG/DL
PROTHROMBIN TIME: 10.1 SEC (ref 9.2–11.2)
RBC # BLD AUTO: 3.27 M/UL (ref 3.8–5.2)
RBC #/AREA URNS HPF: ABNORMAL /HPF (ref 0–5)
SODIUM SERPL-SCNC: 133 MMOL/L (ref 136–145)
SP GR UR REFRACTOMETRY: 1.02 (ref 1–1.03)
SPECIMEN EXP DATE BLD: NORMAL
URINE CULTURE IF INDICATED: ABNORMAL
UROBILINOGEN UR QL STRIP.AUTO: 1 EU/DL (ref 0.2–1)
WBC # BLD AUTO: 6.7 K/UL (ref 3.6–11)
WBC URNS QL MICRO: ABNORMAL /HPF (ref 0–4)

## 2025-04-15 PROCEDURE — 86850 RBC ANTIBODY SCREEN: CPT

## 2025-04-15 PROCEDURE — 80048 BASIC METABOLIC PNL TOTAL CA: CPT

## 2025-04-15 PROCEDURE — 85027 COMPLETE CBC AUTOMATED: CPT

## 2025-04-15 PROCEDURE — 86900 BLOOD TYPING SEROLOGIC ABO: CPT

## 2025-04-15 PROCEDURE — 93010 ELECTROCARDIOGRAM REPORT: CPT | Performed by: INTERNAL MEDICINE

## 2025-04-15 PROCEDURE — 83036 HEMOGLOBIN GLYCOSYLATED A1C: CPT

## 2025-04-15 PROCEDURE — 85610 PROTHROMBIN TIME: CPT

## 2025-04-15 PROCEDURE — 93005 ELECTROCARDIOGRAM TRACING: CPT | Performed by: ORTHOPAEDIC SURGERY

## 2025-04-15 PROCEDURE — 86901 BLOOD TYPING SEROLOGIC RH(D): CPT

## 2025-04-15 PROCEDURE — 81001 URINALYSIS AUTO W/SCOPE: CPT

## 2025-04-15 RX ORDER — ATORVASTATIN CALCIUM 20 MG/1
20 TABLET, FILM COATED ORAL EVERY EVENING
COMMUNITY

## 2025-04-15 ASSESSMENT — PROMIS GLOBAL HEALTH SCALE
WHO IS THE PERSON COMPLETING THE PROMIS V1.1 SURVEY?: SELF
IN GENERAL, HOW WOULD YOU RATE YOUR SATISFACTION WITH YOUR SOCIAL ACTIVITIES AND RELATIONSHIPS [ON A SCALE OF 1 (POOR) TO 5 (EXCELLENT)]?: GOOD
IN GENERAL, WOULD YOU SAY YOUR QUALITY OF LIFE IS...[ON A SCALE OF 1 (POOR) TO 5 (EXCELLENT)]: GOOD
IN GENERAL, PLEASE RATE HOW WELL YOU CARRY OUT YOUR USUAL SOCIAL ACTIVITIES (INCLUDES ACTIVITIES AT HOME, AT WORK, AND IN YOUR COMMUNITY, AND RESPONSIBILITIES AS A PARENT, CHILD, SPOUSE, EMPLOYEE, FRIEND, ETC) [ON A SCALE OF 1 (POOR) TO 5 (EXCELLENT)]?: FAIR
IN THE PAST 7 DAYS, HOW WOULD YOU RATE YOUR PAIN ON AVERAGE [ON A SCALE FROM 0 (NO PAIN) TO 10 (WORST IMAGINABLE PAIN)]?: 5
IN THE PAST 7 DAYS, HOW OFTEN HAVE YOU BEEN BOTHERED BY EMOTIONAL PROBLEMS, SUCH AS FEELING ANXIOUS, DEPRESSED, OR IRRITABLE [ON A SCALE FROM 1 (NEVER) TO 5 (ALWAYS)]?: RARELY
IN GENERAL, WOULD YOU SAY YOUR HEALTH IS...[ON A SCALE OF 1 (POOR) TO 5 (EXCELLENT)]: GOOD
IN THE PAST 7 DAYS, HOW WOULD YOU RATE YOUR FATIGUE ON AVERAGE [ON A SCALE FROM 1 (NONE) TO 5 (VERY SEVERE)]?: MILD
SUM OF RESPONSES TO QUESTIONS 3, 6, 7, & 8: 15
SUM OF RESPONSES TO QUESTIONS 2, 4, 5, & 10: 13
IN GENERAL, HOW WOULD YOU RATE YOUR PHYSICAL HEALTH [ON A SCALE OF 1 (POOR) TO 5 (EXCELLENT)]?: GOOD
TO WHAT EXTENT ARE YOU ABLE TO CARRY OUT YOUR EVERYDAY PHYSICAL ACTIVITIES SUCH AS WALKING, CLIMBING STAIRS, CARRYING GROCERIES, OR MOVING A CHAIR [ON A SCALE OF 1 (NOT AT ALL) TO 5 (COMPLETELY)]?: MODERATELY
IN GENERAL, HOW WOULD YOU RATE YOUR MENTAL HEALTH, INCLUDING YOUR MOOD AND YOUR ABILITY TO THINK [ON A SCALE OF 1 (POOR) TO 5 (EXCELLENT)]?: GOOD
HOW IS THE PROMIS V1.1 BEING ADMINISTERED?: PAPER

## 2025-04-15 ASSESSMENT — PAIN SCALES - GENERAL: PAINLEVEL_OUTOF10: 5

## 2025-04-15 ASSESSMENT — PAIN DESCRIPTION - LOCATION: LOCATION: KNEE

## 2025-04-15 ASSESSMENT — KOOS JR
TWISING OR PIVOTING ON KNEE: SEVERE
HOW SEVERE IS YOUR KNEE STIFFNESS AFTER FIRST WAKING IN MORNING: MODERATE
STANDING UPRIGHT: MILD
BENDING TO THE FLOOR TO PICK UP OBJECT: SEVERE
RISING FROM SITTING: MODERATE
KOOS JR TOTAL INTERVAL SCORE: 50.012
STRAIGHTENING KNEE FULLY: MODERATE
GOING UP OR DOWN STAIRS: MODERATE

## 2025-04-15 ASSESSMENT — PAIN DESCRIPTION - ORIENTATION: ORIENTATION: RIGHT

## 2025-04-15 ASSESSMENT — PAIN DESCRIPTION - DESCRIPTORS: DESCRIPTORS: ACHING

## 2025-04-16 LAB
BACTERIA SPEC CULT: NORMAL
BACTERIA SPEC CULT: NORMAL
SERVICE CMNT-IMP: NORMAL

## 2025-04-17 NOTE — PERIOP NOTE
Left Detailed message for Jordon regarding + MSSA swab /nares left name, date of birth, date of surgery also our NP is currently on vacation, office will need to treat .  
Ortho pre op and medication instructions printed and reviewed with patient. Two bottles of chg soap given. Surgical site infection sheet given.     Consent form signed and on chart for scheduled surgery on 04-@ [facility] with Dr. Alberto Frederick  
PAT: 4-15 @ 11;00 TO WATCH VIDEO  
diabetes, it is important for you to manage your blood sugar levels properly before your surgery as well as after your surgery. Poorly managed blood sugar levels slow down wound healing and prevent you from healing completely.        Testing for Staphylococcus aureus on your skin before surgery    Staphylococcus aureus (staph) is a common bacteria that is found on the body. It normally does not cause infection on healthy skin. Before surgery, you will be tested to see if you have staph by swabbing the inside of your nose. When you have an incision with surgery, the goal is to protect that incision from infection. Removal of the staph bacteria before surgery can decrease the risk of a surgical site infection.    If your nose swab is positive for staph you will be called. Your treatment will include 2 steps:  Prescription for Mupirocin ointment to be used in each nostril twice a day for 5 days.  Showering with Chlorhexidine (CHG) liquid soap for 5 days prior to surgery (follow same CHG Shower Instructions as above).    How to use Mupirocin ointment in your nose   the prescription from your pharmacy. You will receive a large tube of ointment which will be big enough for all of your treatments. You will apply this ointment to each nostril 2 times a day for 5 days.  Wash your hands with  gel or soap and water for 20 seconds before using ointment.  Place a pea-sized amount of ointment on a cotton Q-tip.  Apply ointment just inside of each nostril with the Q-tip. Do not push Q-tip or ointment deep inside you nose.  Press your nostrils together and massage for a few seconds.  Wash your hands with  gel or soap and water after you are finished.  Do not get ointment near your eyes. If it gets into your eyes, rinse them with cool water.  If you need to use nasal spray, clean the tip of the bottle with alcohol before use and do not use both at the same time.  If you are scheduled for COVID testing during

## 2025-04-23 NOTE — H&P
Empagliflozin 25 MG tablet, 25 mg, Disp: , Rfl:     fluconazole (DIFLUCAN) 150 MG tablet, TAKE 1 TABLET BY MOUTH 1 TIME FOR 1 DOSE, Disp: , Rfl:     fluorouracil (EFUDEX) 5 % cream, Apply 1 Application topically every 12 hours, Disp: , Rfl:     fluticasone (FLONASE) 50 MCG/ACT nasal spray, SHAKE LIQUID AND USE 1 SPRAY IN EACH NOSTRIL TWICE DAILY AS NEEDED, Disp: , Rfl:     Melatonin 10 MG capsule, 1 capsule, Disp: , Rfl:     metFORMIN (GLUCOPHAGE) 500 MG tablet, Take 500 mg by mouth 2 (two) times a day with meals, Disp: , Rfl:     nortriptyline (PAMELOR) 25 MG capsule, Take 25 mg by mouth daily, Disp: , Rfl:     omeprazole (PriLOSEC) 40 MG capsule, Take 40 mg by mouth daily, Disp: , Rfl:     pramipexole (MIRAPEX) 1 MG tablet, Take 1 mg by mouth 3 (three) times a day, Disp: , Rfl:     Semaglutide,0.25 or 0.5MG/DOS, (Ozempic, 0.25 or 0.5 MG/DOSE,) 2 MG/3ML solution pen-injector, , Disp: , Rfl:     sitaGLIPtin (JANUVIA) 50 MG tablet, Januvia 50 mg tablet  TAKE 1 TABLET BY MOUTH EVERY DAY, Disp: , Rfl:     telmisartan (MICARDIS) 20 MG tablet, Take by mouth daily, Disp: , Rfl:     traZODone (DESYREL) 100 MG tablet, Take 100 mg by mouth, Disp: , Rfl:     valsartan (DIOVAN) 320 MG tablet, Take 320 mg by mouth daily, Disp: , Rfl:             Family History   Problem Relation Age of Onset    Diabetes Mother      Diabetes Father           Social History               Socioeconomic History    Marital status:        Spouse name: Not on file    Number of children: Not on file    Years of education: Not on file    Highest education level: Not on file   Occupational History    Not on file   Tobacco Use    Smoking status: Never       Passive exposure: Never    Smokeless tobacco: Never   Substance and Sexual Activity    Alcohol use: Never    Drug use: Never    Sexual activity: Not on file   Other Topics Concern    Not on file   Social History Narrative    Not on file      Social Drivers of Health      Financial Resource

## 2025-04-28 ENCOUNTER — HOSPITAL ENCOUNTER (OUTPATIENT)
Facility: HOSPITAL | Age: 75
Discharge: HOME | End: 2025-04-29
Attending: ORTHOPAEDIC SURGERY | Admitting: ORTHOPAEDIC SURGERY
Payer: MEDICARE

## 2025-04-28 ENCOUNTER — ANESTHESIA EVENT (OUTPATIENT)
Facility: HOSPITAL | Age: 75
End: 2025-04-28
Payer: MEDICARE

## 2025-04-28 ENCOUNTER — ANESTHESIA (OUTPATIENT)
Facility: HOSPITAL | Age: 75
End: 2025-04-28
Payer: MEDICARE

## 2025-04-28 ENCOUNTER — APPOINTMENT (OUTPATIENT)
Facility: HOSPITAL | Age: 75
End: 2025-04-28
Attending: ORTHOPAEDIC SURGERY
Payer: MEDICARE

## 2025-04-28 DIAGNOSIS — Z96.651 S/P TOTAL KNEE ARTHROPLASTY, RIGHT: Primary | ICD-10-CM

## 2025-04-28 PROBLEM — M17.11 PRIMARY LOCALIZED OSTEOARTHRITIS OF RIGHT KNEE: Status: ACTIVE | Noted: 2025-04-28

## 2025-04-28 LAB
GLUCOSE BLD STRIP.AUTO-MCNC: 144 MG/DL (ref 65–117)
GLUCOSE BLD STRIP.AUTO-MCNC: 174 MG/DL (ref 65–117)
GLUCOSE BLD STRIP.AUTO-MCNC: 174 MG/DL (ref 65–117)
GLUCOSE BLD STRIP.AUTO-MCNC: 231 MG/DL (ref 65–117)
SERVICE CMNT-IMP: ABNORMAL

## 2025-04-28 PROCEDURE — 7100000000 HC PACU RECOVERY - FIRST 15 MIN: Performed by: ORTHOPAEDIC SURGERY

## 2025-04-28 PROCEDURE — 73560 X-RAY EXAM OF KNEE 1 OR 2: CPT

## 2025-04-28 PROCEDURE — 6370000000 HC RX 637 (ALT 250 FOR IP): Performed by: PHYSICIAN ASSISTANT

## 2025-04-28 PROCEDURE — 3600000005 HC SURGERY LEVEL 5 BASE: Performed by: ORTHOPAEDIC SURGERY

## 2025-04-28 PROCEDURE — 2500000003 HC RX 250 WO HCPCS: Performed by: STUDENT IN AN ORGANIZED HEALTH CARE EDUCATION/TRAINING PROGRAM

## 2025-04-28 PROCEDURE — 82962 GLUCOSE BLOOD TEST: CPT

## 2025-04-28 PROCEDURE — 6360000002 HC RX W HCPCS: Performed by: NURSE ANESTHETIST, CERTIFIED REGISTERED

## 2025-04-28 PROCEDURE — 2709999900 HC NON-CHARGEABLE SUPPLY: Performed by: ORTHOPAEDIC SURGERY

## 2025-04-28 PROCEDURE — 97530 THERAPEUTIC ACTIVITIES: CPT

## 2025-04-28 PROCEDURE — 7100000001 HC PACU RECOVERY - ADDTL 15 MIN: Performed by: ORTHOPAEDIC SURGERY

## 2025-04-28 PROCEDURE — 3600000015 HC SURGERY LEVEL 5 ADDTL 15MIN: Performed by: ORTHOPAEDIC SURGERY

## 2025-04-28 PROCEDURE — 2580000003 HC RX 258: Performed by: ORTHOPAEDIC SURGERY

## 2025-04-28 PROCEDURE — 2580000003 HC RX 258: Performed by: NURSE ANESTHETIST, CERTIFIED REGISTERED

## 2025-04-28 PROCEDURE — 6360000002 HC RX W HCPCS: Performed by: STUDENT IN AN ORGANIZED HEALTH CARE EDUCATION/TRAINING PROGRAM

## 2025-04-28 PROCEDURE — 64447 NJX AA&/STRD FEMORAL NRV IMG: CPT | Performed by: STUDENT IN AN ORGANIZED HEALTH CARE EDUCATION/TRAINING PROGRAM

## 2025-04-28 PROCEDURE — C1713 ANCHOR/SCREW BN/BN,TIS/BN: HCPCS | Performed by: ORTHOPAEDIC SURGERY

## 2025-04-28 PROCEDURE — 97161 PT EVAL LOW COMPLEX 20 MIN: CPT

## 2025-04-28 PROCEDURE — 6360000002 HC RX W HCPCS: Performed by: ORTHOPAEDIC SURGERY

## 2025-04-28 PROCEDURE — 97110 THERAPEUTIC EXERCISES: CPT

## 2025-04-28 PROCEDURE — 2720000010 HC SURG SUPPLY STERILE: Performed by: ORTHOPAEDIC SURGERY

## 2025-04-28 PROCEDURE — C1776 JOINT DEVICE (IMPLANTABLE): HCPCS | Performed by: ORTHOPAEDIC SURGERY

## 2025-04-28 PROCEDURE — 2500000003 HC RX 250 WO HCPCS: Performed by: ORTHOPAEDIC SURGERY

## 2025-04-28 PROCEDURE — 6370000000 HC RX 637 (ALT 250 FOR IP): Performed by: STUDENT IN AN ORGANIZED HEALTH CARE EDUCATION/TRAINING PROGRAM

## 2025-04-28 PROCEDURE — 6360000002 HC RX W HCPCS: Performed by: PHYSICIAN ASSISTANT

## 2025-04-28 PROCEDURE — 2500000003 HC RX 250 WO HCPCS: Performed by: NURSE ANESTHETIST, CERTIFIED REGISTERED

## 2025-04-28 PROCEDURE — 6370000000 HC RX 637 (ALT 250 FOR IP): Performed by: ORTHOPAEDIC SURGERY

## 2025-04-28 PROCEDURE — 3700000000 HC ANESTHESIA ATTENDED CARE: Performed by: ORTHOPAEDIC SURGERY

## 2025-04-28 PROCEDURE — 2500000003 HC RX 250 WO HCPCS: Performed by: PHYSICIAN ASSISTANT

## 2025-04-28 PROCEDURE — 2580000003 HC RX 258: Performed by: STUDENT IN AN ORGANIZED HEALTH CARE EDUCATION/TRAINING PROGRAM

## 2025-04-28 PROCEDURE — 3700000001 HC ADD 15 MINUTES (ANESTHESIA): Performed by: ORTHOPAEDIC SURGERY

## 2025-04-28 DEVICE — JOURNEY II CR FEMORAL OXINIUM NONPOROUS RIGHT SIZE 3
Type: IMPLANTABLE DEVICE | Site: KNEE | Status: FUNCTIONAL
Brand: JOURNEY

## 2025-04-28 DEVICE — JOURNEY II MEDIAL DISHED XLPE                                    ARTICULAR INSERT SIZE 3-4 RIGHT 9MM
Type: IMPLANTABLE DEVICE | Site: KNEE | Status: FUNCTIONAL
Brand: JOURNEY

## 2025-04-28 DEVICE — GENESIS II OVAL RESURFACING                                    PATELLAR 32MM
Type: IMPLANTABLE DEVICE | Site: KNEE | Status: FUNCTIONAL
Brand: GENESIS II

## 2025-04-28 DEVICE — KNEE K1 TOT HEMI STD CEM IMPL CAPPED K1 SN: Type: IMPLANTABLE DEVICE | Site: KNEE | Status: FUNCTIONAL

## 2025-04-28 DEVICE — SMARTSET HV HIGH VISCOSITY BONE CEMENT 40G
Type: IMPLANTABLE DEVICE | Site: KNEE | Status: FUNCTIONAL
Brand: SMARTSET

## 2025-04-28 DEVICE — JOURNEY TIBIAL BASEPLATE NONPOROUS                                    RIGHT SIZE 3
Type: IMPLANTABLE DEVICE | Site: KNEE | Status: FUNCTIONAL
Brand: JOURNEY

## 2025-04-28 RX ORDER — HYDROMORPHONE HYDROCHLORIDE 1 MG/ML
0.5 INJECTION, SOLUTION INTRAMUSCULAR; INTRAVENOUS; SUBCUTANEOUS EVERY 5 MIN PRN
Status: COMPLETED | OUTPATIENT
Start: 2025-04-28 | End: 2025-04-28

## 2025-04-28 RX ORDER — POLYETHYLENE GLYCOL 3350 17 G/17G
17 POWDER, FOR SOLUTION ORAL DAILY
Status: DISCONTINUED | OUTPATIENT
Start: 2025-04-28 | End: 2025-04-29 | Stop reason: HOSPADM

## 2025-04-28 RX ORDER — TRANEXAMIC ACID 100 MG/ML
INJECTION, SOLUTION INTRAVENOUS PRN
Status: DISCONTINUED | OUTPATIENT
Start: 2025-04-28 | End: 2025-04-28 | Stop reason: HOSPADM

## 2025-04-28 RX ORDER — SODIUM CHLORIDE 9 MG/ML
INJECTION, SOLUTION INTRAVENOUS CONTINUOUS
Status: DISPENSED | OUTPATIENT
Start: 2025-04-28 | End: 2025-04-29

## 2025-04-28 RX ORDER — SODIUM CHLORIDE 9 MG/ML
INJECTION, SOLUTION INTRAVENOUS PRN
Status: DISCONTINUED | OUTPATIENT
Start: 2025-04-28 | End: 2025-04-28 | Stop reason: HOSPADM

## 2025-04-28 RX ORDER — ACETAMINOPHEN 500 MG
1000 TABLET ORAL ONCE
Status: COMPLETED | OUTPATIENT
Start: 2025-04-28 | End: 2025-04-28

## 2025-04-28 RX ORDER — SODIUM CHLORIDE 9 MG/ML
INJECTION, SOLUTION INTRAVENOUS PRN
Status: DISCONTINUED | OUTPATIENT
Start: 2025-04-28 | End: 2025-04-29 | Stop reason: HOSPADM

## 2025-04-28 RX ORDER — BISACODYL 10 MG
10 SUPPOSITORY, RECTAL RECTAL DAILY PRN
Status: DISCONTINUED | OUTPATIENT
Start: 2025-04-28 | End: 2025-04-29 | Stop reason: HOSPADM

## 2025-04-28 RX ORDER — SODIUM CHLORIDE 0.9 % (FLUSH) 0.9 %
5-40 SYRINGE (ML) INJECTION EVERY 12 HOURS SCHEDULED
Status: DISCONTINUED | OUTPATIENT
Start: 2025-04-28 | End: 2025-04-29 | Stop reason: HOSPADM

## 2025-04-28 RX ORDER — FENTANYL CITRATE 50 UG/ML
25 INJECTION, SOLUTION INTRAMUSCULAR; INTRAVENOUS EVERY 5 MIN PRN
Status: DISCONTINUED | OUTPATIENT
Start: 2025-04-28 | End: 2025-04-28 | Stop reason: HOSPADM

## 2025-04-28 RX ORDER — PROCHLORPERAZINE EDISYLATE 5 MG/ML
5 INJECTION INTRAMUSCULAR; INTRAVENOUS
Status: DISCONTINUED | OUTPATIENT
Start: 2025-04-28 | End: 2025-04-28 | Stop reason: HOSPADM

## 2025-04-28 RX ORDER — HYDRALAZINE HYDROCHLORIDE 20 MG/ML
10 INJECTION INTRAMUSCULAR; INTRAVENOUS
Status: DISCONTINUED | OUTPATIENT
Start: 2025-04-28 | End: 2025-04-28 | Stop reason: HOSPADM

## 2025-04-28 RX ORDER — OXYCODONE HYDROCHLORIDE 5 MG/1
5 TABLET ORAL
Status: DISCONTINUED | OUTPATIENT
Start: 2025-04-28 | End: 2025-04-28 | Stop reason: HOSPADM

## 2025-04-28 RX ORDER — SODIUM CHLORIDE 0.9 % (FLUSH) 0.9 %
5-40 SYRINGE (ML) INJECTION EVERY 12 HOURS SCHEDULED
Status: DISCONTINUED | OUTPATIENT
Start: 2025-04-28 | End: 2025-04-28 | Stop reason: HOSPADM

## 2025-04-28 RX ORDER — METOPROLOL SUCCINATE 25 MG/1
50 TABLET, EXTENDED RELEASE ORAL EVERY MORNING
Status: DISCONTINUED | OUTPATIENT
Start: 2025-04-28 | End: 2025-04-29 | Stop reason: HOSPADM

## 2025-04-28 RX ORDER — FOLIC ACID 0.4 MG
1000 TABLET ORAL EVERY EVENING
Status: DISCONTINUED | OUTPATIENT
Start: 2025-04-28 | End: 2025-04-28 | Stop reason: SDUPTHER

## 2025-04-28 RX ORDER — OXYCODONE HYDROCHLORIDE 5 MG/1
10 TABLET ORAL EVERY 4 HOURS PRN
Status: DISCONTINUED | OUTPATIENT
Start: 2025-04-28 | End: 2025-04-29 | Stop reason: HOSPADM

## 2025-04-28 RX ORDER — FAMOTIDINE 20 MG/1
20 TABLET, FILM COATED ORAL 2 TIMES DAILY
Status: DISCONTINUED | OUTPATIENT
Start: 2025-04-28 | End: 2025-04-29

## 2025-04-28 RX ORDER — ONDANSETRON 2 MG/ML
INJECTION INTRAMUSCULAR; INTRAVENOUS
Status: DISCONTINUED | OUTPATIENT
Start: 2025-04-28 | End: 2025-04-28 | Stop reason: SDUPTHER

## 2025-04-28 RX ORDER — FENTANYL CITRATE 50 UG/ML
100 INJECTION, SOLUTION INTRAMUSCULAR; INTRAVENOUS
Status: COMPLETED | OUTPATIENT
Start: 2025-04-28 | End: 2025-04-28

## 2025-04-28 RX ORDER — KETOROLAC TROMETHAMINE 30 MG/ML
15 INJECTION, SOLUTION INTRAMUSCULAR; INTRAVENOUS EVERY 6 HOURS
Status: COMPLETED | OUTPATIENT
Start: 2025-04-28 | End: 2025-04-29

## 2025-04-28 RX ORDER — PREGABALIN 75 MG/1
75 CAPSULE ORAL ONCE
Status: COMPLETED | OUTPATIENT
Start: 2025-04-28 | End: 2025-04-28

## 2025-04-28 RX ORDER — ACETAMINOPHEN 325 MG/1
650 TABLET ORAL EVERY 6 HOURS
Status: DISCONTINUED | OUTPATIENT
Start: 2025-04-28 | End: 2025-04-29 | Stop reason: HOSPADM

## 2025-04-28 RX ORDER — 0.9 % SODIUM CHLORIDE 0.9 %
500 INTRAVENOUS SOLUTION INTRAVENOUS ONCE
Status: DISCONTINUED | OUTPATIENT
Start: 2025-04-28 | End: 2025-04-29 | Stop reason: HOSPADM

## 2025-04-28 RX ORDER — ROPIVACAINE HYDROCHLORIDE 2 MG/ML
INJECTION, SOLUTION EPIDURAL; INFILTRATION; PERINEURAL
Status: COMPLETED | OUTPATIENT
Start: 2025-04-28 | End: 2025-04-28

## 2025-04-28 RX ORDER — ONDANSETRON 2 MG/ML
4 INJECTION INTRAMUSCULAR; INTRAVENOUS
Status: DISCONTINUED | OUTPATIENT
Start: 2025-04-28 | End: 2025-04-28 | Stop reason: HOSPADM

## 2025-04-28 RX ORDER — SODIUM CHLORIDE 0.9 % (FLUSH) 0.9 %
5-40 SYRINGE (ML) INJECTION PRN
Status: DISCONTINUED | OUTPATIENT
Start: 2025-04-28 | End: 2025-04-28 | Stop reason: HOSPADM

## 2025-04-28 RX ORDER — HYDROMORPHONE HYDROCHLORIDE 1 MG/ML
0.5 INJECTION, SOLUTION INTRAMUSCULAR; INTRAVENOUS; SUBCUTANEOUS
Status: DISCONTINUED | OUTPATIENT
Start: 2025-04-28 | End: 2025-04-29 | Stop reason: HOSPADM

## 2025-04-28 RX ORDER — METOPROLOL SUCCINATE 25 MG/1
50 TABLET, EXTENDED RELEASE ORAL EVERY MORNING
Status: DISCONTINUED | OUTPATIENT
Start: 2025-04-28 | End: 2025-04-28

## 2025-04-28 RX ORDER — VANCOMYCIN HYDROCHLORIDE 1 G/20ML
INJECTION, POWDER, LYOPHILIZED, FOR SOLUTION INTRAVENOUS PRN
Status: DISCONTINUED | OUTPATIENT
Start: 2025-04-28 | End: 2025-04-28 | Stop reason: HOSPADM

## 2025-04-28 RX ORDER — SENNA AND DOCUSATE SODIUM 50; 8.6 MG/1; MG/1
1 TABLET, FILM COATED ORAL 2 TIMES DAILY
Status: DISCONTINUED | OUTPATIENT
Start: 2025-04-28 | End: 2025-04-29 | Stop reason: HOSPADM

## 2025-04-28 RX ORDER — PRAMIPEXOLE DIHYDROCHLORIDE 1 MG/1
1 TABLET ORAL
Status: DISCONTINUED | OUTPATIENT
Start: 2025-04-28 | End: 2025-04-29 | Stop reason: HOSPADM

## 2025-04-28 RX ORDER — DEXAMETHASONE SODIUM PHOSPHATE 4 MG/ML
INJECTION, SOLUTION INTRA-ARTICULAR; INTRALESIONAL; INTRAMUSCULAR; INTRAVENOUS; SOFT TISSUE
Status: DISCONTINUED | OUTPATIENT
Start: 2025-04-28 | End: 2025-04-28 | Stop reason: SDUPTHER

## 2025-04-28 RX ORDER — SODIUM CHLORIDE 9 MG/ML
INJECTION, SOLUTION INTRAVENOUS
Status: DISCONTINUED | OUTPATIENT
Start: 2025-04-28 | End: 2025-04-28 | Stop reason: SDUPTHER

## 2025-04-28 RX ORDER — FOLIC ACID 1 MG/1
1 TABLET ORAL EVERY EVENING
Status: DISCONTINUED | OUTPATIENT
Start: 2025-04-28 | End: 2025-04-29 | Stop reason: HOSPADM

## 2025-04-28 RX ORDER — SODIUM CHLORIDE, SODIUM LACTATE, POTASSIUM CHLORIDE, CALCIUM CHLORIDE 600; 310; 30; 20 MG/100ML; MG/100ML; MG/100ML; MG/100ML
INJECTION, SOLUTION INTRAVENOUS CONTINUOUS
Status: DISCONTINUED | OUTPATIENT
Start: 2025-04-28 | End: 2025-04-28 | Stop reason: HOSPADM

## 2025-04-28 RX ORDER — FENTANYL CITRATE 50 UG/ML
INJECTION, SOLUTION INTRAMUSCULAR; INTRAVENOUS
Status: DISCONTINUED | OUTPATIENT
Start: 2025-04-28 | End: 2025-04-28 | Stop reason: SDUPTHER

## 2025-04-28 RX ORDER — NORTRIPTYLINE HYDROCHLORIDE 25 MG/1
25 CAPSULE ORAL NIGHTLY
Status: DISCONTINUED | OUTPATIENT
Start: 2025-04-28 | End: 2025-04-29 | Stop reason: HOSPADM

## 2025-04-28 RX ORDER — SODIUM CHLORIDE 0.9 % (FLUSH) 0.9 %
5-40 SYRINGE (ML) INJECTION PRN
Status: DISCONTINUED | OUTPATIENT
Start: 2025-04-28 | End: 2025-04-29 | Stop reason: HOSPADM

## 2025-04-28 RX ORDER — ONDANSETRON 2 MG/ML
4 INJECTION INTRAMUSCULAR; INTRAVENOUS EVERY 6 HOURS PRN
Status: DISCONTINUED | OUTPATIENT
Start: 2025-04-28 | End: 2025-04-29 | Stop reason: HOSPADM

## 2025-04-28 RX ORDER — HYDROXYZINE HYDROCHLORIDE 10 MG/1
10 TABLET, FILM COATED ORAL EVERY 8 HOURS PRN
Status: DISCONTINUED | OUTPATIENT
Start: 2025-04-28 | End: 2025-04-29 | Stop reason: HOSPADM

## 2025-04-28 RX ORDER — PHENYLEPHRINE HCL IN 0.9% NACL 0.4MG/10ML
SYRINGE (ML) INTRAVENOUS
Status: DISCONTINUED | OUTPATIENT
Start: 2025-04-28 | End: 2025-04-28 | Stop reason: SDUPTHER

## 2025-04-28 RX ORDER — ALPRAZOLAM 0.5 MG
0.25 TABLET ORAL DAILY PRN
Status: DISCONTINUED | OUTPATIENT
Start: 2025-04-28 | End: 2025-04-29 | Stop reason: HOSPADM

## 2025-04-28 RX ORDER — TRAZODONE HYDROCHLORIDE 100 MG/1
100 TABLET ORAL NIGHTLY
Status: DISCONTINUED | OUTPATIENT
Start: 2025-04-28 | End: 2025-04-29 | Stop reason: HOSPADM

## 2025-04-28 RX ORDER — TRANEXAMIC ACID 100 MG/ML
INJECTION, SOLUTION INTRAVENOUS
Status: DISCONTINUED | OUTPATIENT
Start: 2025-04-28 | End: 2025-04-28 | Stop reason: SDUPTHER

## 2025-04-28 RX ORDER — LIDOCAINE HYDROCHLORIDE 10 MG/ML
1 INJECTION, SOLUTION EPIDURAL; INFILTRATION; INTRACAUDAL; PERINEURAL
Status: DISCONTINUED | OUTPATIENT
Start: 2025-04-28 | End: 2025-04-28 | Stop reason: HOSPADM

## 2025-04-28 RX ORDER — PROPOFOL 10 MG/ML
INJECTION, EMULSION INTRAVENOUS
Status: DISCONTINUED | OUTPATIENT
Start: 2025-04-28 | End: 2025-04-28 | Stop reason: SDUPTHER

## 2025-04-28 RX ORDER — ATORVASTATIN CALCIUM 20 MG/1
20 TABLET, FILM COATED ORAL EVERY EVENING
Status: DISCONTINUED | OUTPATIENT
Start: 2025-04-28 | End: 2025-04-29 | Stop reason: HOSPADM

## 2025-04-28 RX ORDER — OXYCODONE HYDROCHLORIDE 5 MG/1
5 TABLET ORAL EVERY 4 HOURS PRN
Status: DISCONTINUED | OUTPATIENT
Start: 2025-04-28 | End: 2025-04-29 | Stop reason: HOSPADM

## 2025-04-28 RX ORDER — NALOXONE HYDROCHLORIDE 0.4 MG/ML
INJECTION, SOLUTION INTRAMUSCULAR; INTRAVENOUS; SUBCUTANEOUS PRN
Status: DISCONTINUED | OUTPATIENT
Start: 2025-04-28 | End: 2025-04-28 | Stop reason: HOSPADM

## 2025-04-28 RX ORDER — ACETAMINOPHEN 500 MG
1000 TABLET ORAL ONCE
Status: DISCONTINUED | OUTPATIENT
Start: 2025-04-28 | End: 2025-04-29 | Stop reason: HOSPADM

## 2025-04-28 RX ORDER — METHOCARBAMOL 500 MG/1
500 TABLET, FILM COATED ORAL 4 TIMES DAILY PRN
Status: DISCONTINUED | OUTPATIENT
Start: 2025-04-28 | End: 2025-04-29 | Stop reason: HOSPADM

## 2025-04-28 RX ORDER — PANTOPRAZOLE SODIUM 40 MG/1
40 TABLET, DELAYED RELEASE ORAL
Status: DISCONTINUED | OUTPATIENT
Start: 2025-04-28 | End: 2025-04-29 | Stop reason: HOSPADM

## 2025-04-28 RX ORDER — ASPIRIN 81 MG/1
81 TABLET ORAL 2 TIMES DAILY
Status: DISCONTINUED | OUTPATIENT
Start: 2025-04-29 | End: 2025-04-29 | Stop reason: HOSPADM

## 2025-04-28 RX ORDER — CHLORTHALIDONE 25 MG/1
25 TABLET ORAL EVERY MORNING
Status: DISCONTINUED | OUTPATIENT
Start: 2025-04-29 | End: 2025-04-29 | Stop reason: HOSPADM

## 2025-04-28 RX ORDER — VALSARTAN 160 MG/1
320 TABLET ORAL EVERY MORNING
Status: DISCONTINUED | OUTPATIENT
Start: 2025-04-29 | End: 2025-04-29 | Stop reason: HOSPADM

## 2025-04-28 RX ORDER — MIDAZOLAM HYDROCHLORIDE 2 MG/2ML
2 INJECTION, SOLUTION INTRAMUSCULAR; INTRAVENOUS PRN
Status: DISCONTINUED | OUTPATIENT
Start: 2025-04-28 | End: 2025-04-28 | Stop reason: HOSPADM

## 2025-04-28 RX ORDER — CELECOXIB 200 MG/1
400 CAPSULE ORAL ONCE
Status: COMPLETED | OUTPATIENT
Start: 2025-04-28 | End: 2025-04-28

## 2025-04-28 RX ADMIN — KETOROLAC TROMETHAMINE 15 MG: 30 INJECTION, SOLUTION INTRAMUSCULAR; INTRAVENOUS at 17:21

## 2025-04-28 RX ADMIN — KETOROLAC TROMETHAMINE 15 MG: 30 INJECTION, SOLUTION INTRAMUSCULAR; INTRAVENOUS at 12:01

## 2025-04-28 RX ADMIN — METOPROLOL SUCCINATE 50 MG: 25 TABLET, EXTENDED RELEASE ORAL at 15:10

## 2025-04-28 RX ADMIN — HYDROMORPHONE HYDROCHLORIDE 0.5 MG: 1 INJECTION, SOLUTION INTRAMUSCULAR; INTRAVENOUS; SUBCUTANEOUS at 11:08

## 2025-04-28 RX ADMIN — WATER 2000 MG: 1 INJECTION INTRAMUSCULAR; INTRAVENOUS; SUBCUTANEOUS at 15:10

## 2025-04-28 RX ADMIN — ONDANSETRON 4 MG: 2 INJECTION INTRAMUSCULAR; INTRAVENOUS at 08:50

## 2025-04-28 RX ADMIN — FENTANYL CITRATE 20 MCG: 50 INJECTION INTRAMUSCULAR; INTRAVENOUS at 08:55

## 2025-04-28 RX ADMIN — SENNOSIDES AND DOCUSATE SODIUM 1 TABLET: 50; 8.6 TABLET ORAL at 12:00

## 2025-04-28 RX ADMIN — FENTANYL CITRATE 20 MCG: 50 INJECTION INTRAMUSCULAR; INTRAVENOUS at 09:13

## 2025-04-28 RX ADMIN — CELECOXIB 400 MG: 200 CAPSULE ORAL at 06:56

## 2025-04-28 RX ADMIN — FENTANYL CITRATE 20 MCG: 50 INJECTION INTRAMUSCULAR; INTRAVENOUS at 08:24

## 2025-04-28 RX ADMIN — FENTANYL CITRATE 20 MCG: 50 INJECTION INTRAMUSCULAR; INTRAVENOUS at 08:10

## 2025-04-28 RX ADMIN — PROPOFOL 65 MCG/KG/MIN: 10 INJECTION, EMULSION INTRAVENOUS at 07:43

## 2025-04-28 RX ADMIN — TRAZODONE HYDROCHLORIDE 100 MG: 100 TABLET ORAL at 21:05

## 2025-04-28 RX ADMIN — ATORVASTATIN CALCIUM 20 MG: 20 TABLET, FILM COATED ORAL at 17:21

## 2025-04-28 RX ADMIN — Medication 120 MCG: at 08:32

## 2025-04-28 RX ADMIN — MEPIVACAINE HYDROCHLORIDE 52.5 MG: 15 INJECTION, SOLUTION EPIDURAL; INFILTRATION at 07:35

## 2025-04-28 RX ADMIN — SODIUM CHLORIDE, SODIUM LACTATE, POTASSIUM CHLORIDE, AND CALCIUM CHLORIDE: .6; .31; .03; .02 INJECTION, SOLUTION INTRAVENOUS at 06:37

## 2025-04-28 RX ADMIN — FENTANYL CITRATE 50 MCG: 50 INJECTION INTRAMUSCULAR; INTRAVENOUS at 07:14

## 2025-04-28 RX ADMIN — Medication 1 MG: at 17:21

## 2025-04-28 RX ADMIN — PROPOFOL 20 MG: 10 INJECTION, EMULSION INTRAVENOUS at 07:56

## 2025-04-28 RX ADMIN — Medication 100 MCG: at 08:59

## 2025-04-28 RX ADMIN — ACETAMINOPHEN 650 MG: 325 TABLET ORAL at 12:01

## 2025-04-28 RX ADMIN — PHENYLEPHRINE HYDROCHLORIDE 20 MCG/MIN: 10 INJECTION INTRAVENOUS at 07:43

## 2025-04-28 RX ADMIN — PREGABALIN 75 MG: 75 CAPSULE ORAL at 06:56

## 2025-04-28 RX ADMIN — PROPOFOL 30 MG: 10 INJECTION, EMULSION INTRAVENOUS at 07:41

## 2025-04-28 RX ADMIN — ACETAMINOPHEN 650 MG: 325 TABLET ORAL at 23:32

## 2025-04-28 RX ADMIN — ACETAMINOPHEN 1000 MG: 500 TABLET ORAL at 06:55

## 2025-04-28 RX ADMIN — KETOROLAC TROMETHAMINE 15 MG: 30 INJECTION, SOLUTION INTRAMUSCULAR; INTRAVENOUS at 23:31

## 2025-04-28 RX ADMIN — TRANEXAMIC ACID 1000 MG: 100 INJECTION, SOLUTION INTRAVENOUS at 07:53

## 2025-04-28 RX ADMIN — SENNOSIDES AND DOCUSATE SODIUM 1 TABLET: 50; 8.6 TABLET ORAL at 21:05

## 2025-04-28 RX ADMIN — HYDROMORPHONE HYDROCHLORIDE 0.5 MG: 1 INJECTION, SOLUTION INTRAMUSCULAR; INTRAVENOUS; SUBCUTANEOUS at 10:25

## 2025-04-28 RX ADMIN — ACETAMINOPHEN 650 MG: 325 TABLET ORAL at 17:21

## 2025-04-28 RX ADMIN — PROPOFOL 20 MG: 10 INJECTION, EMULSION INTRAVENOUS at 07:50

## 2025-04-28 RX ADMIN — SODIUM CHLORIDE, PRESERVATIVE FREE 10 ML: 5 INJECTION INTRAVENOUS at 23:38

## 2025-04-28 RX ADMIN — Medication 120 MCG: at 08:18

## 2025-04-28 RX ADMIN — WATER 2000 MG: 1 INJECTION INTRAMUSCULAR; INTRAVENOUS; SUBCUTANEOUS at 07:52

## 2025-04-28 RX ADMIN — METFORMIN HYDROCHLORIDE 500 MG: 500 TABLET ORAL at 15:10

## 2025-04-28 RX ADMIN — ROPIVACAINE HYDROCHLORIDE 20 ML: 2 INJECTION, SOLUTION EPIDURAL; INFILTRATION at 07:05

## 2025-04-28 RX ADMIN — FAMOTIDINE 20 MG: 20 TABLET, FILM COATED ORAL at 21:05

## 2025-04-28 RX ADMIN — NORTRIPTYLINE HYDROCHLORIDE 25 MG: 25 CAPSULE ORAL at 21:05

## 2025-04-28 RX ADMIN — PRAMIPEXOLE DIHYDROCHLORIDE 1 MG: 1 TABLET ORAL at 21:05

## 2025-04-28 RX ADMIN — Medication 80 MCG: at 08:53

## 2025-04-28 RX ADMIN — PANTOPRAZOLE SODIUM 40 MG: 40 TABLET, DELAYED RELEASE ORAL at 15:10

## 2025-04-28 RX ADMIN — FENTANYL CITRATE 20 MCG: 50 INJECTION INTRAMUSCULAR; INTRAVENOUS at 08:58

## 2025-04-28 RX ADMIN — DEXAMETHASONE SODIUM PHOSPHATE 4 MG: 4 INJECTION, SOLUTION INTRAMUSCULAR; INTRAVENOUS at 08:33

## 2025-04-28 RX ADMIN — SODIUM CHLORIDE: 9 INJECTION, SOLUTION INTRAVENOUS at 09:07

## 2025-04-28 RX ADMIN — Medication 80 MCG: at 08:15

## 2025-04-28 RX ADMIN — MIDAZOLAM HYDROCHLORIDE 2 MG: 1 INJECTION, SOLUTION INTRAMUSCULAR; INTRAVENOUS at 07:10

## 2025-04-28 ASSESSMENT — PAIN SCALES - GENERAL
PAINLEVEL_OUTOF10: 4
PAINLEVEL_OUTOF10: 7
PAINLEVEL_OUTOF10: 5
PAINLEVEL_OUTOF10: 6
PAINLEVEL_OUTOF10: 0
PAINLEVEL_OUTOF10: 6
PAINLEVEL_OUTOF10: 5

## 2025-04-28 ASSESSMENT — PAIN DESCRIPTION - ORIENTATION
ORIENTATION: RIGHT

## 2025-04-28 ASSESSMENT — PAIN - FUNCTIONAL ASSESSMENT
PAIN_FUNCTIONAL_ASSESSMENT: NONE - DENIES PAIN
PAIN_FUNCTIONAL_ASSESSMENT: 0-10
PAIN_FUNCTIONAL_ASSESSMENT: NONE - DENIES PAIN
PAIN_FUNCTIONAL_ASSESSMENT: ADULT NONVERBAL PAIN SCALE (NPVS)

## 2025-04-28 ASSESSMENT — PAIN DESCRIPTION - LOCATION
LOCATION: KNEE

## 2025-04-28 ASSESSMENT — PAIN DESCRIPTION - DESCRIPTORS
DESCRIPTORS: ACHING
DESCRIPTORS: ACHING

## 2025-04-28 NOTE — DISCHARGE INSTRUCTIONS
Dr. Frederick's Total Knee Replacement Postoperative Instructions    Follow-Up Appointment:  Follow up in the office 2 weeks after surgery.  If an appointment is not already scheduled, please call our office at [phone], extension [phone].  Activity:  Application of ice regularly will prevent inflammation and reduce pain and swelling.  You should ice the area as much as tolerated, but at least 3 times per day for 20-30 minutes.  Ambulate every hour. Use your incentive spirometer every half hour when resting.  Full extension at the knee is important. Avoid placing a pillow or bump behind the knee. Rather, support the heel and allow gravity to help fully straighten the knee.  Perform your exercises as often as possible, at least 3 times per day. Work on increasing the bend of the knee.  Avoid extremes of motion and vigorous activities.  You may bear full weight on your operative extremity with a walker and transition to a cane as soon as you feel comfortable and strong enough.  Advance your activity in a stepwise and cautious manner. You will likely feel better than your replaced joint is ready for.    Refrain from any high-level activities until we see you back at your follow up appointment. This includes golfing, exercising, and other more intense activities.  Do not kneel on your operative knee for 3 months after surgery.  Prevent any falls. Clear your living environment of rugs or floor objects that may be tripping hazards. Use an assistive device as needed for balance and support.  Dressings / Wound Care:  Keep dressing in place until the follow-up visit.   Do not apply antibiotic ointment, creams, or lotions to your incision.  Most incisions are closed with absorbable sutures, but if not, the sutures or staples will be removed at your 2 week follow up.  Dermabond (skin glue) may be used to help close the incision, which appears as a thin, transparent covering over the incision. Do not pick or pull at this

## 2025-04-28 NOTE — OP NOTE
subcuticularly. Dermabond was applied and covered with an occlusive dressing after it dried. Surgical cast padding was applied followed by an Ace wrap.  Surgical counts were correct at the end of the procedure.The patient was awakened from anesthesia and brought to the PACU in stable condition.    Postoperative plan:  The right lower extremity is weight-bearing as tolerated. Physical therapy will be consulted for gait training with anticipated discharge when stable and pain controlled. DVT prophylaxis will be given for 4 weeks.      Alberto Frederick MD    04/28/25

## 2025-04-28 NOTE — PERIOP NOTE
TRANSFER - OUT REPORT:    Verbal report given to ANITHA RN(name) on Kasia Campbell  being transferred to 561(unit) for routine post-op       Report consisted of patient’s Situation, Background, Assessment and   Recommendations(SBAR).     Time Pre op antibiotic given:Y  Anesthesia Stop time: Y    Information from the following report(s) SBAR was reviewed with the receiving nurse.    Opportunity for questions and clarification was provided.     Is the patient on 02? Yes       L/Min 2       Other     Is the patient on a monitor? No    Is the nurse transporting with the patient? No    At transfer, are there Patient Belongings with the patient?  If Yes, please note/list:    Surgical Waiting Area notified of patient's transfer from PACU? Yes

## 2025-04-28 NOTE — ANESTHESIA POSTPROCEDURE EVALUATION
Post-Anesthesia Evaluation and Assessment    Patient: Kasia Campbell MRN: [id number]  SSN: [id number]    YOB: 1950  Age: 74 y.o.  Sex: female      I have evaluated the patient and they are stable and ready for discharge from the PACU.     Cardiovascular Function/Vital Signs  Visit Vitals  /66   Pulse 62   Temp 97 °F (36.1 °C) (Oral)   Resp 14   SpO2 100%       Patient is status post Spinal anesthesia for Procedure(s):  RIGHT TOTAL KNEE ARTHROPLASTY (SPINAL/REGIONAL).    Nausea/Vomiting: None    Postoperative hydration reviewed and adequate.    Pain:  Managed    Neurological Status:   At baseline    Mental Status, Level of Consciousness: Alert and  oriented to person, place, and time    Pulmonary Status:   Adequate oxygenation and airway patent    Complications related to anesthesia: None    Post-anesthesia assessment completed. No concerns    Signed By: Miriam Matta DO     April 28, 2025

## 2025-04-28 NOTE — ANESTHESIA PROCEDURE NOTES
Peripheral Block    Patient location during procedure: pre-op  Reason for block: post-op pain management and at surgeon's request  Start time: 4/28/2025 7:05 AM  End time: 4/28/2025 7:10 AM  Staffing  Performed: anesthesiologist   Anesthesiologist: Miriam Matta DO  Performed by: Miriam Matta DO  Authorized by: Miriam Matta DO    Preanesthetic Checklist  Completed: patient identified, IV checked, site marked, risks and benefits discussed, surgical/procedural consents, equipment checked, pre-op evaluation, timeout performed, anesthesia consent given, oxygen available, monitors applied/VS acknowledged and fire risk safety assessment completed and verbalized  Peripheral Block   Patient position: supine  Prep: ChloraPrep  Provider prep: mask and sterile gloves  Patient monitoring: cardiac monitor, continuous pulse ox, frequent blood pressure checks, IV access and oxygen  Block type: Saphenous  Laterality: right  Injection technique: single-shot  Guidance: ultrasound guidedDose: 30 mL    Needle   Needle type: insulated echogenic nerve stimulator needle   Needle gauge: 21 G  Needle localization: anatomical landmarks and ultrasound guidance  Needle length: 10 cm  Assessment   Injection assessment: negative aspiration for heme, no paresthesia on injection, local visualized surrounding nerve on ultrasound and no intravascular symptoms  Paresthesia pain: none  Slow fractionated injection: yes  Hemodynamics: stable  Outcomes: uncomplicated and patient tolerated procedure well    Medications Administered  ropivacaine (NAROPIN) injection 0.2% - Perineural   20 mL - 4/28/2025 7:05:00 AM

## 2025-04-28 NOTE — ANESTHESIA PROCEDURE NOTES
Spinal Block    Patient location during procedure: OR  End time: 4/28/2025 7:40 AM  Reason for block: primary anesthetic  Staffing  Performed: anesthesiologist   Anesthesiologist: Miriam Matta DO  Performed by: Miriam Matta DO  Authorized by: Miriam Matta DO    Spinal Block  Patient position: sitting  Prep: DuraPrep  Patient monitoring: cardiac monitor, continuous pulse ox, frequent blood pressure checks and oxygen  Approach: midline  Location: L4/L5  Provider prep: mask and sterile gloves  Needle  Needle type: pencil-tip   Needle gauge: 25 G  Needle length: 4 in  Assessment  Sensory level: T4  Events: None  Swirl obtained: Yes  CSF: clear  Attempts: 1  Hemodynamics: stable  Preanesthetic Checklist  Completed: patient identified, IV checked, site marked, risks and benefits discussed, surgical/procedural consents, equipment checked, pre-op evaluation, timeout performed, anesthesia consent given, oxygen available, monitors applied/VS acknowledged and fire risk safety assessment completed and verbalized

## 2025-04-28 NOTE — FLOWSHEET NOTE
04/28/25 0807   Family Communication    Relationship to Patient Spouse    Phone Number Kishan Campbell [phone]   Family/Significant Other Update Called   Delivery Origin Nurse   Message Disposition Family present - message delivered   Update Given Yes   Family Communication   Family Update Message Procedure started

## 2025-04-29 VITALS
SYSTOLIC BLOOD PRESSURE: 107 MMHG | HEART RATE: 75 BPM | TEMPERATURE: 98.3 F | OXYGEN SATURATION: 99 % | RESPIRATION RATE: 18 BRPM | DIASTOLIC BLOOD PRESSURE: 54 MMHG

## 2025-04-29 LAB
ANION GAP SERPL CALC-SCNC: 4 MMOL/L (ref 2–12)
BUN SERPL-MCNC: 28 MG/DL (ref 6–20)
BUN/CREAT SERPL: 22 (ref 12–20)
CALCIUM SERPL-MCNC: 9.1 MG/DL (ref 8.5–10.1)
CHLORIDE SERPL-SCNC: 103 MMOL/L (ref 97–108)
CO2 SERPL-SCNC: 27 MMOL/L (ref 21–32)
CREAT SERPL-MCNC: 1.25 MG/DL (ref 0.55–1.02)
GLUCOSE SERPL-MCNC: 148 MG/DL (ref 65–100)
HCT VFR BLD AUTO: 27.1 % (ref 35–47)
HGB BLD-MCNC: 9.2 G/DL (ref 11.5–16)
POTASSIUM SERPL-SCNC: 4.9 MMOL/L (ref 3.5–5.1)
SODIUM SERPL-SCNC: 134 MMOL/L (ref 136–145)

## 2025-04-29 PROCEDURE — 97116 GAIT TRAINING THERAPY: CPT

## 2025-04-29 PROCEDURE — 85014 HEMATOCRIT: CPT

## 2025-04-29 PROCEDURE — 6370000000 HC RX 637 (ALT 250 FOR IP): Performed by: PHYSICIAN ASSISTANT

## 2025-04-29 PROCEDURE — APPNB60 APP NON BILLABLE TIME 46-60 MINS: Performed by: NURSE PRACTITIONER

## 2025-04-29 PROCEDURE — 85018 HEMOGLOBIN: CPT

## 2025-04-29 PROCEDURE — 6360000002 HC RX W HCPCS: Performed by: PHYSICIAN ASSISTANT

## 2025-04-29 PROCEDURE — 6370000000 HC RX 637 (ALT 250 FOR IP): Performed by: NURSE PRACTITIONER

## 2025-04-29 PROCEDURE — 97165 OT EVAL LOW COMPLEX 30 MIN: CPT | Performed by: OCCUPATIONAL THERAPIST

## 2025-04-29 PROCEDURE — 97535 SELF CARE MNGMENT TRAINING: CPT | Performed by: OCCUPATIONAL THERAPIST

## 2025-04-29 PROCEDURE — 97530 THERAPEUTIC ACTIVITIES: CPT

## 2025-04-29 PROCEDURE — 80048 BASIC METABOLIC PNL TOTAL CA: CPT

## 2025-04-29 RX ORDER — SENNA AND DOCUSATE SODIUM 50; 8.6 MG/1; MG/1
1 TABLET, FILM COATED ORAL 2 TIMES DAILY
Qty: 60 TABLET | Refills: 0 | Status: SHIPPED | OUTPATIENT
Start: 2025-04-29

## 2025-04-29 RX ORDER — ASPIRIN 81 MG/1
81 TABLET ORAL 2 TIMES DAILY
Qty: 60 TABLET | Refills: 0 | Status: SHIPPED | OUTPATIENT
Start: 2025-04-29 | End: 2025-05-29

## 2025-04-29 RX ORDER — METHOCARBAMOL 500 MG/1
500 TABLET, FILM COATED ORAL 4 TIMES DAILY PRN
Qty: 30 TABLET | Refills: 0 | Status: SHIPPED | OUTPATIENT
Start: 2025-04-29 | End: 2025-05-29

## 2025-04-29 RX ORDER — OXYCODONE HYDROCHLORIDE 5 MG/1
5 TABLET ORAL EVERY 4 HOURS PRN
Qty: 30 TABLET | Refills: 0 | Status: SHIPPED | OUTPATIENT
Start: 2025-04-29 | End: 2025-05-04

## 2025-04-29 RX ORDER — SIMETHICONE 80 MG
80 TABLET,CHEWABLE ORAL ONCE
Status: COMPLETED | OUTPATIENT
Start: 2025-04-29 | End: 2025-04-29

## 2025-04-29 RX ORDER — ONDANSETRON 4 MG/1
4 TABLET, FILM COATED ORAL 3 TIMES DAILY PRN
Qty: 10 TABLET | Refills: 0 | Status: SHIPPED | OUTPATIENT
Start: 2025-04-29

## 2025-04-29 RX ADMIN — OXYCODONE 5 MG: 5 TABLET ORAL at 06:42

## 2025-04-29 RX ADMIN — METFORMIN HYDROCHLORIDE 500 MG: 500 TABLET ORAL at 08:38

## 2025-04-29 RX ADMIN — PANTOPRAZOLE SODIUM 40 MG: 40 TABLET, DELAYED RELEASE ORAL at 08:01

## 2025-04-29 RX ADMIN — KETOROLAC TROMETHAMINE 15 MG: 30 INJECTION, SOLUTION INTRAMUSCULAR; INTRAVENOUS at 10:59

## 2025-04-29 RX ADMIN — SIMETHICONE 80 MG: 80 TABLET, CHEWABLE ORAL at 11:00

## 2025-04-29 RX ADMIN — ACETAMINOPHEN 650 MG: 325 TABLET ORAL at 11:00

## 2025-04-29 RX ADMIN — FAMOTIDINE 20 MG: 20 TABLET, FILM COATED ORAL at 08:38

## 2025-04-29 RX ADMIN — POLYETHYLENE GLYCOL 3350 17 G: 17 POWDER, FOR SOLUTION ORAL at 08:37

## 2025-04-29 RX ADMIN — SENNOSIDES AND DOCUSATE SODIUM 1 TABLET: 50; 8.6 TABLET ORAL at 08:39

## 2025-04-29 RX ADMIN — ASPIRIN 81 MG: 81 TABLET, COATED ORAL at 08:39

## 2025-04-29 RX ADMIN — ONDANSETRON 4 MG: 2 INJECTION, SOLUTION INTRAMUSCULAR; INTRAVENOUS at 08:00

## 2025-04-29 RX ADMIN — KETOROLAC TROMETHAMINE 15 MG: 30 INJECTION, SOLUTION INTRAMUSCULAR; INTRAVENOUS at 06:50

## 2025-04-29 RX ADMIN — ACETAMINOPHEN 650 MG: 325 TABLET ORAL at 06:51

## 2025-04-29 RX ADMIN — OXYCODONE HYDROCHLORIDE 10 MG: 5 TABLET ORAL at 11:50

## 2025-04-29 ASSESSMENT — PAIN DESCRIPTION - LOCATION
LOCATION: KNEE

## 2025-04-29 ASSESSMENT — PAIN SCALES - GENERAL
PAINLEVEL_OUTOF10: 7
PAINLEVEL_OUTOF10: 6
PAINLEVEL_OUTOF10: 4
PAINLEVEL_OUTOF10: 5
PAINLEVEL_OUTOF10: 4
PAINLEVEL_OUTOF10: 4

## 2025-04-29 ASSESSMENT — PAIN DESCRIPTION - ORIENTATION
ORIENTATION: RIGHT

## 2025-04-29 ASSESSMENT — PAIN DESCRIPTION - DESCRIPTORS
DESCRIPTORS: ACHING

## 2025-04-29 NOTE — DISCHARGE SUMMARY
Ortho Discharge Summary    Patient ID:  Kasia KIMBROUGH Rice  [id number]  female  74 y.o.  1950    Admit date: 4/28/2025    Discharge date: 4/29/2025    Admitting Physician: Alberto Frederick MD     Consulting Physician(s):   Treatment Team:   Alberto Frederick MD Tanner, Gregory, MD Vandenhoek, Daniel, Tatyana Rae RN Kafle, Amrita, RN Kolster, Sarah C, PTA Vozar, Lisa M, OTR/L    Date of Surgery:   4/28/2025     Preoperative Diagnosis:  Primary osteoarthritis of right knee [M17.11]    Postoperative Diagnosis:   * No post-op diagnosis entered *    Procedure(s):   RIGHT TOTAL KNEE ARTHROPLASTY (SPINAL/REGIONAL)     Anesthesia Type:   Spinal     Surgeon: Alberto Frederick MD                            HPI:  Pt is a 74 y.o. female who has a history of Primary osteoarthritis of right knee [M17.11]  with pain and limitations of activities of daily living who presents at this time for a RIGHT TOTAL KNEE ARTHROPLASTY (SPINAL/REGIONAL) following the failure of conservative management.    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 : A BMI > 30 is classified as obesity and > 40 is classified as morbid obesity.     Medications upon admission :   Prior to Admission Medications   Prescriptions Last Dose Informant Patient Reported? Taking?   ALPRAZolam (XANAX) 0.25 MG tablet 4/28/2025 at  2:00 AM  Yes Yes   Sig: Take 1 tablet by mouth as needed.   Folic Acid (FOLATE PO) Past Month  Yes Yes   Sig: Take 1 mg by mouth every evening

## 2025-04-29 NOTE — CARE COORDINATION
MAGALY:    Home with [facility]   transport  CM ordered walker 4/29    CM confirmed face sheet, HH choice, and need for walker. Outpatient in a bed.        04/29/25 1104   Service Assessment   Cognition Alert   History Provided By Patient   Primary Caregiver Self   Support Systems Spouse/Significant Other   Patient's Healthcare Decision Maker is: Named in Scanned ACP Document   PCP Verified by CM Yes   Last Visit to PCP Within last 3 months   Prior Functional Level Independent in ADLs/IADLs   Current Functional Level Assistance with the following:;Mobility   Can patient return to prior living arrangement Yes   Ability to make needs known: Good   Family able to assist with home care needs: Yes   Social/Functional History   Type of Home House   Discharge Planning   Patient expects to be discharged to: House   Services At/After Discharge   Confirm Follow Up Transport Family   Condition of Participation: Discharge Planning   The Plan for Transition of Care is related to the following treatment goals: Home Health   The Patient and/or Patient Representative was provided with a Choice of Provider? Patient   The Patient and/Or Patient Representative agree with the Discharge Plan? Yes   Freedom of Choice list was provided with basic dialogue that supports the patient's individualized plan of care/goals, treatment preferences, and shares the quality data associated with the providers?  Yes

## 2025-04-29 NOTE — PLAN OF CARE
Problem: Chronic Conditions and Co-morbidities  Goal: Patient's chronic conditions and co-morbidity symptoms are monitored and maintained or improved  4/29/2025 0925 by Padmini Baker RN  Outcome: Progressing  4/29/2025 0806 by Tatyana Holloway RN  Outcome: Progressing     Problem: Pain  Goal: Verbalizes/displays adequate comfort level or baseline comfort level  4/29/2025 0925 by Padmini Baker RN  Outcome: Progressing  4/29/2025 0806 by Tatyana Holloway RN  Outcome: Progressing     Problem: Safety - Adult  Goal: Free from fall injury  4/29/2025 0925 by Padmini Baker RN  Outcome: Progressing  4/29/2025 0806 by Tatyana Holloway RN  Outcome: Progressing     Problem: Discharge Planning  Goal: Discharge to home or other facility with appropriate resources  Outcome: Progressing     
  Problem: Chronic Conditions and Co-morbidities  Goal: Patient's chronic conditions and co-morbidity symptoms are monitored and maintained or improved  Outcome: Progressing     Problem: Pain  Goal: Verbalizes/displays adequate comfort level or baseline comfort level  Outcome: Progressing     Problem: Safety - Adult  Goal: Free from fall injury  Outcome: Progressing     Problem: Discharge Planning  Goal: Discharge to home or other facility with appropriate resources  Outcome: Progressing     
  Problem: Pain  Goal: Verbalizes/displays adequate comfort level or baseline comfort level  Outcome: Progressing     Problem: Safety - Adult  Goal: Free from fall injury  Outcome: Progressing     Problem: Chronic Conditions and Co-morbidities  Goal: Patient's chronic conditions and co-morbidity symptoms are monitored and maintained or improved  Outcome: Progressing     
  Problem: Physical Therapy - Adult  Goal: By Discharge: Performs mobility at highest level of function for planned discharge setting.  See evaluation for individualized goals.  Description: FUNCTIONAL STATUS PRIOR TO ADMISSION: Patient was independent and active without use of DME.    HOME SUPPORT PRIOR TO ADMISSION: The patient lived with  but did not require assistance.    Physical Therapy Goals  Initiated 4/28/2025  1.  Patient will move from supine to sit and sit to supine, scoot up and down, and roll side to side in bed with supervision/set-up within 4 day(s).    2.  Patient will perform sit to stand with supervision/set-up within 4 day(s).  3.  Patient will transfer from bed to chair and chair to bed with supervision/set-up using the least restrictive device within 4 day(s).  4.  Patient will ambulate with supervision/set-up for 200 feet with the least restrictive device within 4 day(s).   5.  Patient will ascend/descend 4 stairs with one handrail(s) with contact guard assist within 4 day(s).  6. Patient will perform tka home exercise program per protocol with modified independence within 4 days.  7. Patient will demonstrate AROM 0-90 degrees in operative joint within 4 days.     Outcome: Progressing       PHYSICAL THERAPY TREATMENT    Patient: Kasia Campbell (74 y.o. female)  Date: 4/29/2025  Diagnosis: Primary osteoarthritis of right knee [M17.11]  Primary localized osteoarthritis of right knee [M17.11] Primary localized osteoarthritis of right knee  Procedure(s) (LRB):  RIGHT TOTAL KNEE ARTHROPLASTY (SPINAL/REGIONAL) (Right) 1 Day Post-Op  Precautions: Restrictions/Precautions  Restrictions/Precautions: Fall Risk            ASSESSMENT:  Patient continues to benefit from skilled PT services and is progressing towards goals. Pt reports pain has increased from yesterday. Pt was able to increase gait tolerance. Pt had no LOB with task. Pt was able to perform steps for home entry. Pt has good family 
  []                                        []                                        []                                           Knee Extension Stretch     []                                          []                                          []                                          []                                           Heel Slides  10 [x]                                        []                                        []                                        []                                           Long Arc Quads   []                                        []                                        []                                        []                                           Knee Flexion Stretch   []                                        []                                        []                                        []                                           Straight Leg Raises   []                                        []                                        []                                        []                                                                                                                                                                                                                                                                                                                                                                                                                                                                                               [facility] AM-PAC®      Basic Mobility Inpatient Short Form (6-Clicks) Version 2  How much HELP from another person do you currently need... (If the patient hasn't done an activity recently, how much help from another person do you think they would need if they tried?) Total A Lot A Little None   1.  Turning from your back to your side while in a flat bed without using bedrails? []  1 []  2 [x]  3  []  4

## 2025-04-29 NOTE — PROGRESS NOTES
KNEE DISABILITY OSTEOARTHRITIS AND OUTCOME SCORE    Stiffness - The following question concerns the amount of joing stiffness you have experienced during the last week in your knee. Stiffness is a sensation of restriction or slowness in the ease with which you move your knee joint.  How severe is your knee stiffness after first wakening in the morning?: 2        Pain - What amount of knee pain have you experienced the last week during the following activities?  Twisting/pivoting on your knee: 3  Straightening knee fully: 2  Going up or down stairs: 2  Standing upright: 1        Function - Please indicate the degree of difficulty you have experienced in the last week due to your knee.  Rising from sittin  Bending to floor/ an object: 3        Raw Score  Jr YANIV. Knee Survey Score: 15  KOOS JR Total Interval Score (0-100 Scale): 50.012      PROMIS Questions    In general, would you say your health is:: 3    In general, would you say your quality of life is:: 3    In general, how would you rate your physical health?: 3    In general, how would you rate your mental health, including your mood and your ability to think?: 3    To what extent are you able to carry out your everyday physical activities such as walking, climbing stairs, carrying groceries, or moving a chair?: 3    In the past 7 days how often have you been bothered by emotional problems such as feeling anxious, depressed or irritable?: 4    In the past 7 days how would you rate your fatigue on average?: 4    In the past 7 days how would you rate your pain on average?: 5    In general, please rate how well you carry out your usual social activities and roles. (This includes activities at home, at work and in your community, and responsibilities as a parent, child, spouse, employee, friend, etc.): 2    In general, how would you rate your satisfaction with your social activities and relationships?: 3    How comfortable are you filling out medical 
Ortho NP Note    POD# 1  s/p RIGHT TOTAL KNEE ARTHROPLASTY (SPINAL/REGIONAL)   Pt seen with JAME Vick. No visitor.     Pt resting in bed comfortably.   Reports appropriate postop knee pain, received one dose of oxycodone this morning, now experiencing some nausea, RN at bedside with zofran   Able to ambulate to bathroom overnight. Motivated to work with PT and return home today   Postop plan reviewed - No complaints/concerns.    VSS Afebrile.    Visit Vitals  /60   Pulse 68   Temp 98.4 °F (36.9 °C) (Oral)   Resp 18   SpO2 96%       Voiding status: voiding         Labs    Lab Results   Component Value Date/Time    HGB 9.2 04/29/2025 07:30 AM      Lab Results   Component Value Date/Time    INR 0.9 04/15/2025 11:13 AM      Lab Results   Component Value Date/Time     04/15/2025 11:13 AM    K 4.7 04/15/2025 11:13 AM    CL 97 04/15/2025 11:13 AM    CO2 30 04/15/2025 11:13 AM    BUN 20 04/15/2025 11:13 AM     Recent Glucose Results:   Glucose   Date Value Ref Range Status   04/15/2025 124 (H) 65 - 100 mg/dL Final   03/10/2023 176 (H) 65 - 100 mg/dL Final           There is no height or weight on file to calculate BMI. : A BMI > 30 is classified as obesity and > 40 is classified as morbid obesity.       Ace wrap dressing c.d.i  Cryotherapy in place over incision  Calves soft and supple; No pain with passive stretch  Sensation and motor intact. +PF/DF/EHL intact   SCDs for mechanical DVT proph while in bed     PLAN:  1) PT BID, OT - WBAT  2) Aspirin 81 mg PO BID for DVT Prophylaxis. Encouraged early mobilization, bed exercises, and SCD use.  3) Pain control - scheduled tylenol  and toradol, and prn  oxycodone and robaxin .  4) Post op care - Continue bowel regimen, encouraged IS. Straight cath per protocol. Optifoam/Mepilex to remain in place x 7 day unless integrity is lost.    5) Readniess for discharge:     [x] Vital Signs stable    [x] Hgb stable    [x] + Voiding    [x] Wound intact, drainage minimal    [x] 
Patient was discharged,discharged instruction was given to the patient.  
Training: No    Transfers:     Transfer Training  Transfer Training: Yes  Overall Level of Assistance: Stand by assistance  Interventions: Safety awareness training;Verbal cues  Sit to Stand: Stand by assistance  Stand to Sit: Stand by assistance                                   Balance:      Balance  Sitting: Intact  Standing: Impaired  Standing - Static: Good  Standing - Dynamic: Good      ADL Assessment:          Feeding: Independent       Grooming: Supervision  Grooming Skilled Clinical Factors: standing at the sink    UE Bathing: Setup  UE Bathing Skilled Clinical Factors: seated         LE Bathing: Minimal assistance  LE Bathing Skilled Clinical Factors: sponge bathing standing and sitting    UE Dressing: Modified independent   UE Dressing Skilled Clinical Factors: seated    LE Dressing: Minimal assistance  LE Dressing Skilled Clinical Factors: right sock and shoe    Toileting: Stand by assistance              Functional Mobility: Stand by assistance;Adaptive equipment             ADL Intervention and task modifications:    Educated on role of OT    Educated on fall prevention related to:  -footwear and benefit of shoes around heel, avoid slides/flip-flops, loose crocs  -use of protective undergarment at night and intentional toileting during the day to prevent rushing to the bathroom  - Educated on slow to rise transfers with transition sup to standing  - safety awareness if feeling dizzy/lightheaded, benefit of ankle pumps, modification of task    Educated on positioning in supine to facilitate neutral alignment    Bathing:  -do not submerge or soak wound in water  -follow doctors instructions on wound care and bandage   -only touch incisions with clean hands  -don't scrub over incisions and use a clean wash cloth and towel each time with bathing until incisions are healed  - shower or sponge bathe if indicated  -Patient did indicate understanding as evidenced by verbalizing.   -if any questions

## (undated) DEVICE — 4-PORT MANIFOLD: Brand: NEPTUNE 2

## (undated) DEVICE — SOLUTION IRRIG 3000ML LAC RINGERS ARTHROMTC PLAS CONT

## (undated) DEVICE — MARKER,SKIN,WI/RULER AND LABELS: Brand: MEDLINE

## (undated) DEVICE — PADDING CAST W6INXL4YD ST COT RAYON MICROPLEATED HIGHLY

## (undated) DEVICE — GAUZE,SPONGE,4"X4",16PLY,STRL,LF,10/TRAY: Brand: MEDLINE

## (undated) DEVICE — SOLUTION SURG PREP 26 CC PURPREP

## (undated) DEVICE — SUTURE STRATAFIX SYMMETRIC PDS + SZ 1 L18IN ABSRB VLT L48MM SXPP1A400

## (undated) DEVICE — GLOVE SURG SZ 65 L12IN FNGR THK94MIL STD WHT LTX FREE

## (undated) DEVICE — APPLICATOR MEDICATED 26 CC SOLUTION HI LT ORNG CHLORAPREP

## (undated) DEVICE — SOLUTION WND IRRIGATION 450 ML 0.5 PVP-I 0.9 NACL

## (undated) DEVICE — GLOVE SURG SZ 8 L12IN FNGR THK79MIL GRN LTX FREE

## (undated) DEVICE — HANDPIECE SET WITH BONE CLEANING TIP AND SUCTION TUBE: Brand: INTERPULSE

## (undated) DEVICE — SUTURE VICRYL ABSORBABLE BRAIDED 2-0 CT 36 IN DA UD  VCP957H

## (undated) DEVICE — 3.5 MM FULL RADIUS STRAIGHT                                    BLADES, POWER/EP-1, BEIGE, PACKAGED                                    6 PER BOX, STERILE

## (undated) DEVICE — SYRINGE 20ML LL S/C 50

## (undated) DEVICE — COVER,LIGHT HANDLE,FLX,1/PK: Brand: MEDLINE INDUSTRIES, INC.

## (undated) DEVICE — TOTAL JOINT - SMH: Brand: MEDLINE INDUSTRIES, INC.

## (undated) DEVICE — ARTHROSCOPY - RICHMOND: Brand: MEDLINE INDUSTRIES, INC.

## (undated) DEVICE — HOOD, PEEL-AWAY: Brand: FLYTE

## (undated) DEVICE — SUTURE MONOCRYL STRATAFIX SPRL + 3 0 SGL ARMED PS 1 24 IN LEN SXMP1B103

## (undated) DEVICE — ELECTRODE PT RET AD L9FT HI MOIST COND ADH HYDRGEL CORDED

## (undated) DEVICE — SUTURE VICRYL SZ 1 L36IN ABSRB UD L36MM CT-1 1/2 CIR J947H

## (undated) DEVICE — DRAPE,REIN 53X77,STERILE: Brand: MEDLINE

## (undated) DEVICE — DYONICS 25 INFLOW TUBE SET, 3 PER BOX

## (undated) DEVICE — GLOVE ORTHO 7 1/2   MSG9475

## (undated) DEVICE — SPONGE GZ W4XL4IN COT 12 PLY TYP VII WVN C FLD DSGN STERILE

## (undated) DEVICE — CONTAINER,SPECIMEN,3OZ,OR STRL: Brand: MEDLINE

## (undated) DEVICE — GLOVE SURG SZ 7 L12IN FNGR THK79MIL GRN LTX FREE

## (undated) DEVICE — Z DISCONTINUED USE 2368424 BOOT POS LEG DEMAYO

## (undated) DEVICE — DRESSING,GAUZE,XEROFORM,CURAD,1"X8",ST: Brand: CURAD

## (undated) DEVICE — BANDAGE COMPR M W6INXL10YD WHT BGE VELC E MTRX HK AND LOOP

## (undated) DEVICE — 3M™ STERI-DRAPE™ U-DRAPE 1015: Brand: STERI-DRAPE™

## (undated) DEVICE — SUTURE NONABSORBABLE MONOFILAMENT 3-0 PS-1 18 IN BLK ETHILON 1663H

## (undated) DEVICE — SCRUBIN SCRUB BRUSH DRY STER: Brand: MEDLINE INDUSTRIES, INC.

## (undated) DEVICE — LIQUIBAND RAPID ADHESIVE 36/CS 0.8ML: Brand: MEDLINE

## (undated) DEVICE — SOL IRR SOD CHL 0.9% TITAN XL CNTNR 3000ML

## (undated) DEVICE — ZIMMER® STERILE DISPOSABLE TOURNIQUET CUFF WITH PROTECTIVE SLEEVE AND PLC, DUAL PORT, SINGLE BLADDER, 34 IN. (86 CM)

## (undated) DEVICE — YANKAUER,FLEXIBLE HANDLE,REGLR CAPACITY: Brand: MEDLINE INDUSTRIES, INC.

## (undated) DEVICE — DRAPE,EXTREMITY, ULTRAGARD: Brand: MEDLINE

## (undated) DEVICE — DRESSING FOAM POST OPERATIVE 4X10 IN MEPILEX BORDER AG

## (undated) DEVICE — MIXER BNE CEM MINI SMARTMIX

## (undated) DEVICE — HYPODERMIC SAFETY NEEDLE: Brand: MAGELLAN